# Patient Record
Sex: MALE | Race: WHITE | HISPANIC OR LATINO | Employment: UNEMPLOYED | URBAN - METROPOLITAN AREA
[De-identification: names, ages, dates, MRNs, and addresses within clinical notes are randomized per-mention and may not be internally consistent; named-entity substitution may affect disease eponyms.]

---

## 2022-02-22 ENCOUNTER — HOSPITAL ENCOUNTER (EMERGENCY)
Facility: HOSPITAL | Age: 37
Discharge: HOME | End: 2022-02-22
Attending: EMERGENCY MEDICINE | Admitting: EMERGENCY MEDICINE
Payer: COMMERCIAL

## 2022-02-22 VITALS
WEIGHT: 208.78 LBS | RESPIRATION RATE: 18 BRPM | HEART RATE: 84 BPM | SYSTOLIC BLOOD PRESSURE: 119 MMHG | OXYGEN SATURATION: 99 % | DIASTOLIC BLOOD PRESSURE: 73 MMHG

## 2022-02-22 DIAGNOSIS — F13.239 BENZODIAZEPINE WITHDRAWAL (HCC): Primary | ICD-10-CM

## 2022-02-22 LAB
ALBUMIN SERPL BCP-MCNC: 4.4 G/DL (ref 3.5–5)
ALP SERPL-CCNC: 55 U/L (ref 46–116)
ALT SERPL W P-5'-P-CCNC: 173 U/L (ref 12–78)
ANION GAP SERPL CALCULATED.3IONS-SCNC: 11 MMOL/L (ref 4–13)
APAP SERPL-MCNC: <2 UG/ML (ref 10–20)
AST SERPL W P-5'-P-CCNC: 298 U/L (ref 5–45)
ATRIAL RATE: 77 BPM
BASOPHILS # BLD AUTO: 0.08 THOUSANDS/ΜL (ref 0–0.1)
BASOPHILS NFR BLD AUTO: 1 % (ref 0–1)
BILIRUB SERPL-MCNC: 0.81 MG/DL (ref 0.2–1)
BUN SERPL-MCNC: 17 MG/DL (ref 5–25)
CALCIUM SERPL-MCNC: 9.1 MG/DL (ref 8.3–10.1)
CHLORIDE SERPL-SCNC: 103 MMOL/L (ref 100–108)
CO2 SERPL-SCNC: 26 MMOL/L (ref 21–32)
CREAT SERPL-MCNC: 1.07 MG/DL (ref 0.6–1.3)
EOSINOPHIL # BLD AUTO: 0.15 THOUSAND/ΜL (ref 0–0.61)
EOSINOPHIL NFR BLD AUTO: 2 % (ref 0–6)
ERYTHROCYTE [DISTWIDTH] IN BLOOD BY AUTOMATED COUNT: 14.9 % (ref 11.6–15.1)
ETHANOL SERPL-MCNC: <3 MG/DL (ref 0–3)
GFR SERPL CREATININE-BSD FRML MDRD: 90 ML/MIN/1.73SQ M
GLUCOSE SERPL-MCNC: 89 MG/DL (ref 65–140)
HCT VFR BLD AUTO: 45.4 % (ref 36.5–49.3)
HGB BLD-MCNC: 14.2 G/DL (ref 12–17)
IMM GRANULOCYTES # BLD AUTO: 0.02 THOUSAND/UL (ref 0–0.2)
IMM GRANULOCYTES NFR BLD AUTO: 0 % (ref 0–2)
LYMPHOCYTES # BLD AUTO: 1.98 THOUSANDS/ΜL (ref 0.6–4.47)
LYMPHOCYTES NFR BLD AUTO: 26 % (ref 14–44)
MCH RBC QN AUTO: 29 PG (ref 26.8–34.3)
MCHC RBC AUTO-ENTMCNC: 31.3 G/DL (ref 31.4–37.4)
MCV RBC AUTO: 93 FL (ref 82–98)
MONOCYTES # BLD AUTO: 1.06 THOUSAND/ΜL (ref 0.17–1.22)
MONOCYTES NFR BLD AUTO: 14 % (ref 4–12)
NEUTROPHILS # BLD AUTO: 4.3 THOUSANDS/ΜL (ref 1.85–7.62)
NEUTS SEG NFR BLD AUTO: 57 % (ref 43–75)
NRBC BLD AUTO-RTO: 0 /100 WBCS
P AXIS: 78 DEGREES
PLATELET # BLD AUTO: 282 THOUSANDS/UL (ref 149–390)
PMV BLD AUTO: 12.2 FL (ref 8.9–12.7)
POTASSIUM SERPL-SCNC: 4.6 MMOL/L (ref 3.5–5.3)
PR INTERVAL: 138 MS
PROT SERPL-MCNC: 7.9 G/DL (ref 6.4–8.2)
QRS AXIS: 47 DEGREES
QRSD INTERVAL: 82 MS
QT INTERVAL: 388 MS
QTC INTERVAL: 439 MS
RBC # BLD AUTO: 4.89 MILLION/UL (ref 3.88–5.62)
SALICYLATES SERPL-MCNC: <3 MG/DL (ref 3–20)
SODIUM SERPL-SCNC: 140 MMOL/L (ref 136–145)
T WAVE AXIS: 23 DEGREES
VENTRICULAR RATE: 77 BPM
WBC # BLD AUTO: 7.59 THOUSAND/UL (ref 4.31–10.16)

## 2022-02-22 PROCEDURE — 80179 DRUG ASSAY SALICYLATE: CPT | Performed by: EMERGENCY MEDICINE

## 2022-02-22 PROCEDURE — 96365 THER/PROPH/DIAG IV INF INIT: CPT

## 2022-02-22 PROCEDURE — 93010 ELECTROCARDIOGRAM REPORT: CPT | Performed by: INTERNAL MEDICINE

## 2022-02-22 PROCEDURE — 80143 DRUG ASSAY ACETAMINOPHEN: CPT | Performed by: EMERGENCY MEDICINE

## 2022-02-22 PROCEDURE — 96375 TX/PRO/DX INJ NEW DRUG ADDON: CPT

## 2022-02-22 PROCEDURE — 85025 COMPLETE CBC W/AUTO DIFF WBC: CPT | Performed by: EMERGENCY MEDICINE

## 2022-02-22 PROCEDURE — 82077 ASSAY SPEC XCP UR&BREATH IA: CPT | Performed by: EMERGENCY MEDICINE

## 2022-02-22 PROCEDURE — 80053 COMPREHEN METABOLIC PANEL: CPT | Performed by: EMERGENCY MEDICINE

## 2022-02-22 PROCEDURE — 36415 COLL VENOUS BLD VENIPUNCTURE: CPT | Performed by: EMERGENCY MEDICINE

## 2022-02-22 PROCEDURE — 99284 EMERGENCY DEPT VISIT MOD MDM: CPT | Performed by: EMERGENCY MEDICINE

## 2022-02-22 PROCEDURE — 99285 EMERGENCY DEPT VISIT HI MDM: CPT

## 2022-02-22 PROCEDURE — 93005 ELECTROCARDIOGRAM TRACING: CPT

## 2022-02-22 RX ORDER — LORAZEPAM 2 MG/ML
2 INJECTION INTRAMUSCULAR ONCE
Status: COMPLETED | OUTPATIENT
Start: 2022-02-22 | End: 2022-02-22

## 2022-02-22 RX ORDER — LORAZEPAM 1 MG/1
0.5 TABLET ORAL EVERY 8 HOURS PRN
Qty: 32 TABLET | Refills: 0 | Status: SHIPPED | OUTPATIENT
Start: 2022-02-22 | End: 2022-02-22 | Stop reason: CLARIF

## 2022-02-22 RX ORDER — SODIUM CHLORIDE, SODIUM GLUCONATE, SODIUM ACETATE, POTASSIUM CHLORIDE, MAGNESIUM CHLORIDE, SODIUM PHOSPHATE, DIBASIC, AND POTASSIUM PHOSPHATE .53; .5; .37; .037; .03; .012; .00082 G/100ML; G/100ML; G/100ML; G/100ML; G/100ML; G/100ML; G/100ML
1000 INJECTION, SOLUTION INTRAVENOUS ONCE
Status: COMPLETED | OUTPATIENT
Start: 2022-02-22 | End: 2022-02-22

## 2022-02-22 RX ADMIN — LORAZEPAM 2 MG: 2 INJECTION INTRAMUSCULAR; INTRAVENOUS at 12:11

## 2022-02-22 RX ADMIN — SODIUM CHLORIDE, SODIUM GLUCONATE, SODIUM ACETATE, POTASSIUM CHLORIDE, MAGNESIUM CHLORIDE, SODIUM PHOSPHATE, DIBASIC, AND POTASSIUM PHOSPHATE 1000 ML: .53; .5; .37; .037; .03; .012; .00082 INJECTION, SOLUTION INTRAVENOUS at 12:16

## 2022-02-22 NOTE — ED PROVIDER NOTES
History  Chief Complaint   Patient presents with    Shaking     Pt currently being treated at [address] [place] [address] for alcohol addiction  Known seizure history; medications recently changed; has been exp shaking, change in mental status, and severely diaphoretic since   Altered Mental Status     HPI   72-year-old male past medical history of seizure disorder, alcohol abuse presents with tremors  He states that his last drink was 2 weeks ago  He has been at [facility] recovery for his alcohol abuse for the past 4 days  He states that 3 days ago he started shaking, has been diaphoretic  He states that he was taking Ativan chronically but this was abruptly discontinued when he entered [facility] recovery  No seizures  None       No past medical history on file  No past surgical history on file  No family history on file  I have reviewed and agree with the history as documented  No existing history information found  No existing history information found  Social History     Tobacco Use    Smoking status: Not on file    Smokeless tobacco: Not on file   Substance Use Topics    Alcohol use: Not on file    Drug use: Not on file       Review of Systems   Constitutional: Positive for diaphoresis  Negative for chills and fever  HENT: Negative for dental problem and ear pain  Eyes: Negative for pain and redness  Respiratory: Negative for cough and shortness of breath  Cardiovascular: Negative for chest pain and palpitations  Gastrointestinal: Negative for abdominal pain and nausea  Endocrine: Negative for polydipsia and polyphagia  Genitourinary: Negative for dysuria and frequency  Musculoskeletal: Negative for arthralgias and joint swelling  Skin: Negative for color change and rash  Neurological: Positive for tremors  Negative for dizziness and headaches  Psychiatric/Behavioral: Negative for behavioral problems and confusion     All other systems reviewed and are negative  Physical Exam  Physical Exam  Vitals and nursing note reviewed  Constitutional:       General: He is not in acute distress  Appearance: He is well-developed  He is not diaphoretic  HENT:      Head: Atraumatic  Right Ear: External ear normal       Left Ear: External ear normal       Nose: Nose normal    Eyes:      Conjunctiva/sclera: Conjunctivae normal       Pupils: Pupils are equal, round, and reactive to light  Neck:      Vascular: No JVD  Cardiovascular:      Rate and Rhythm: Normal rate and regular rhythm  Heart sounds: Normal heart sounds  No murmur heard  Pulmonary:      Effort: Pulmonary effort is normal  No respiratory distress  Breath sounds: Normal breath sounds  No wheezing  Abdominal:      General: Bowel sounds are normal  There is no distension  Palpations: Abdomen is soft  Tenderness: There is no abdominal tenderness  Musculoskeletal:         General: Normal range of motion  Cervical back: Normal range of motion and neck supple  Skin:     General: Skin is warm and dry  Capillary Refill: Capillary refill takes less than 2 seconds  Neurological:      Mental Status: He is alert and oriented to person, place, and time  Cranial Nerves: No cranial nerve deficit        Comments: +whole body shaking   Psychiatric:         Behavior: Behavior normal          Vital Signs  ED Triage Vitals [02/22/22 1134]   Temp Pulse Respirations Blood Pressure SpO2   -- 83 21 (!) 150/101 99 %      Temp src Heart Rate Source Patient Position - Orthostatic VS BP Location FiO2 (%)   -- Monitor Sitting Right arm --      Pain Score       --           Vitals:    02/22/22 1145 02/22/22 1200 02/22/22 1300 02/22/22 1330   BP: 154/88 131/77 127/74 119/73   Pulse: 82 87 80 84   Patient Position - Orthostatic VS:  Sitting Sitting Lying         Visual Acuity      ED Medications  Medications   multi-electrolyte (PLASMALYTE-A/ISOLYTE-S PH 7 4) IV solution 1,000 mL (0 mL Intravenous Stopped 2/22/22 1332)   LORazepam (ATIVAN) injection 2 mg (2 mg Intravenous Given 2/22/22 1211)       Diagnostic Studies  Results Reviewed     Procedure Component Value Units Date/Time    Comprehensive metabolic panel [[id number]]  (Abnormal) Collected: 02/22/22 1212    Lab Status: Final result Specimen: Blood from Arm, Left Updated: 02/22/22 1242     Sodium 140 mmol/L      Potassium 4 6 mmol/L      Chloride 103 mmol/L      CO2 26 mmol/L      ANION GAP 11 mmol/L      BUN 17 mg/dL      Creatinine 1 07 mg/dL      Glucose 89 mg/dL      Calcium 9 1 mg/dL       U/L       U/L      Alkaline Phosphatase 55 U/L      Total Protein 7 9 g/dL      Albumin 4 4 g/dL      Total Bilirubin 0 81 mg/dL      eGFR 90 ml/min/1 73sq m     Narrative:      [place] guidelines for Chronic Kidney Disease (CKD):     Stage 1 with normal or high GFR (GFR > 90 mL/min/1 73 square meters)    Stage 2 Mild CKD (GFR = 60-89 mL/min/1 73 square meters)    Stage 3A Moderate CKD (GFR = 45-59 mL/min/1 73 square meters)    Stage 3B Moderate CKD (GFR = 30-44 mL/min/1 73 square meters)    Stage 4 Severe CKD (GFR = 15-29 mL/min/1 73 square meters)    Stage 5 End Stage CKD (GFR <15 mL/min/1 73 square meters)  Note: GFR calculation is accurate only with a steady state creatinine    Salicylate level [[id number]]  (Abnormal) Collected: 02/22/22 1212    Lab Status: Final result Specimen: Blood from Arm, Left Updated: 26/71/62 1278     Salicylate Lvl <3 mg/dL     Acetaminophen level-If concentration is detectable, please discuss with medical  on call   [[id number]]  (Abnormal) Collected: 02/22/22 1212    Lab Status: Final result Specimen: Blood from Arm, Left Updated: 02/22/22 1242     Acetaminophen Level <2 0 ug/mL     Ethanol [[id number]]  (Normal) Collected: 02/22/22 1212    Lab Status: Final result Specimen: Blood from Arm, Left Updated: 02/22/22 1232     Ethanol Lvl <3 mg/dL     CBC and differential [[id number]]  (Abnormal) Collected: 02/22/22 1212    Lab Status: Final result Specimen: Blood from Arm, Left Updated: 02/22/22 1218     WBC 7 59 Thousand/uL      RBC 4 89 Million/uL      Hemoglobin 14 2 g/dL      Hematocrit 45 4 %      MCV 93 fL      MCH 29 0 pg      MCHC 31 3 g/dL      RDW 14 9 %      MPV 12 2 fL      Platelets 659 Thousands/uL      nRBC 0 /100 WBCs      Neutrophils Relative 57 %      Immat GRANS % 0 %      Lymphocytes Relative 26 %      Monocytes Relative 14 %      Eosinophils Relative 2 %      Basophils Relative 1 %      Neutrophils Absolute 4 30 Thousands/µL      Immature Grans Absolute 0 02 Thousand/uL      Lymphocytes Absolute 1 98 Thousands/µL      Monocytes Absolute 1 06 Thousand/µL      Eosinophils Absolute 0 15 Thousand/µL      Basophils Absolute 0 08 Thousands/µL                  No orders to display              Procedures  Procedures         ED Course                               SBIRT 22yo+      Most Recent Value   SBIRT (24 yo +)    In order to provide better care to our patients, we are screening all of our patients for alcohol and drug use  Would it be okay to ask you these screening questions? Yes Filed at: 02/22/2022 1333   Initial Alcohol Screen: US AUDIT-C     1  How often do you have a drink containing alcohol? 0 Filed at: 02/22/2022 1333   2  How many drinks containing alcohol do you have on a typical day you are drinking? 0 Filed at: 02/22/2022 1333   3a  Male UNDER 65: How often do you have five or more drinks on one occasion? 0 Filed at: 02/22/2022 1333   3b  FEMALE Any Age, or MALE 65+: How often do you have 4 or more drinks on one occassion? 0 Filed at: 02/22/2022 1333   Audit-C Score 0 Filed at: 02/22/2022 1333   IRENE: How many times in the past year have you    Used an illegal drug or used a prescription medication for non-medical reasons?  Never Filed at: 02/22/2022 1333                    MDM  Patient presents with shaking, diaphoresis after abruptly stopping ativan when he arrived at alcohol rehab  Symptoms resolved with ativan  He wishes to go back to rehab  I discussed with rehab physician Dr Terry Peralta who will start patient on klonipin taper and he can return to rehab  Disposition  Final diagnoses:   Benzodiazepine withdrawal ([address] )     Time reflects when diagnosis was documented in both MDM as applicable and the Disposition within this note     Time User Action Codes Description Comment    2/22/2022 12:51 PM Guru Cooper Add [N99 648] Benzodiazepine withdrawal [facility])       ED Disposition     ED Disposition Condition Date/Time Comment    Discharge Stable Tue Feb 22, 2022 12:51 PM Asif Ortiz discharge to home/self care  Follow-up Information     Follow up With Specialties Details Why Contact Info Additional Information    [address] [facility] Emergency Department Emergency Medicine   [address] [facility] [id number] [facility] Emergency Department, [address] [place] [address], [facility]          There are no discharge medications for this patient  No discharge procedures on file      PDMP Review     None          ED Provider  Electronically Signed by           Erich Vuong MD  02/22/22 8668

## 2022-03-01 ENCOUNTER — APPOINTMENT (EMERGENCY)
Dept: CT IMAGING | Facility: HOSPITAL | Age: 37
DRG: 101 | End: 2022-03-01
Payer: COMMERCIAL

## 2022-03-01 ENCOUNTER — HOSPITAL ENCOUNTER (INPATIENT)
Facility: HOSPITAL | Age: 37
LOS: 3 days | Discharge: OTHER INSTITUTION | DRG: 101 | End: 2022-03-04
Attending: EMERGENCY MEDICINE | Admitting: INTERNAL MEDICINE
Payer: COMMERCIAL

## 2022-03-01 DIAGNOSIS — G40.919 BREAKTHROUGH SEIZURE (HCC): Primary | ICD-10-CM
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
## 2022-03-01 PROBLEM — R56.9 SEIZURE (HCC): Status: ACTIVE | Noted: 2022-03-01

## 2022-03-01 PROBLEM — F10.10 ALCOHOL ABUSE: Status: ACTIVE | Noted: 2022-03-01

## 2022-03-01 PROBLEM — I10 HYPERTENSION: Status: ACTIVE | Noted: 2022-03-01

## 2022-03-01 PROBLEM — R51.9 HEADACHE: Status: ACTIVE | Noted: 2022-03-01

## 2022-03-01 PROBLEM — F32.A DEPRESSION: Status: ACTIVE | Noted: 2022-03-01

## 2022-03-01 LAB
2HR DELTA HS TROPONIN: >0 NG/L
ALBUMIN SERPL BCP-MCNC: 4.1 G/DL (ref 3.5–5)
ALP SERPL-CCNC: 50 U/L (ref 46–116)
ALT SERPL W P-5'-P-CCNC: 61 U/L (ref 12–78)
ANION GAP SERPL CALCULATED.3IONS-SCNC: 9 MMOL/L (ref 4–13)
AST SERPL W P-5'-P-CCNC: 26 U/L (ref 5–45)
ATRIAL RATE: 83 BPM
BASOPHILS # BLD AUTO: 0.06 THOUSANDS/ΜL (ref 0–0.1)
BASOPHILS NFR BLD AUTO: 1 % (ref 0–1)
BILIRUB SERPL-MCNC: 0.36 MG/DL (ref 0.2–1)
BUN SERPL-MCNC: 17 MG/DL (ref 5–25)
CALCIUM SERPL-MCNC: 9.2 MG/DL (ref 8.3–10.1)
CARDIAC TROPONIN I PNL SERPL HS: 2 NG/L
CARDIAC TROPONIN I PNL SERPL HS: <2 NG/L
CHLORIDE SERPL-SCNC: 104 MMOL/L (ref 100–108)
CO2 SERPL-SCNC: 28 MMOL/L (ref 21–32)
CREAT SERPL-MCNC: 1.18 MG/DL (ref 0.6–1.3)
EOSINOPHIL # BLD AUTO: 0.11 THOUSAND/ΜL (ref 0–0.61)
EOSINOPHIL NFR BLD AUTO: 2 % (ref 0–6)
ERYTHROCYTE [DISTWIDTH] IN BLOOD BY AUTOMATED COUNT: 14.9 % (ref 11.6–15.1)
GFR SERPL CREATININE-BSD FRML MDRD: 80 ML/MIN/1.73SQ M
GLUCOSE SERPL-MCNC: 110 MG/DL (ref 65–140)
HCT VFR BLD AUTO: 42.1 % (ref 36.5–49.3)
HGB BLD-MCNC: 13.7 G/DL (ref 12–17)
IMM GRANULOCYTES # BLD AUTO: 0.01 THOUSAND/UL (ref 0–0.2)
IMM GRANULOCYTES NFR BLD AUTO: 0 % (ref 0–2)
LYMPHOCYTES # BLD AUTO: 1.75 THOUSANDS/ΜL (ref 0.6–4.47)
LYMPHOCYTES NFR BLD AUTO: 23 % (ref 14–44)
MAGNESIUM SERPL-MCNC: 2.1 MG/DL (ref 1.6–2.6)
MCH RBC QN AUTO: 29.4 PG (ref 26.8–34.3)
MCHC RBC AUTO-ENTMCNC: 32.5 G/DL (ref 31.4–37.4)
MCV RBC AUTO: 90 FL (ref 82–98)
MONOCYTES # BLD AUTO: 0.78 THOUSAND/ΜL (ref 0.17–1.22)
MONOCYTES NFR BLD AUTO: 10 % (ref 4–12)
NEUTROPHILS # BLD AUTO: 4.81 THOUSANDS/ΜL (ref 1.85–7.62)
NEUTS SEG NFR BLD AUTO: 64 % (ref 43–75)
NRBC BLD AUTO-RTO: 0 /100 WBCS
P AXIS: 71 DEGREES
PLATELET # BLD AUTO: 249 THOUSANDS/UL (ref 149–390)
PMV BLD AUTO: 11.5 FL (ref 8.9–12.7)
POTASSIUM SERPL-SCNC: 4.2 MMOL/L (ref 3.5–5.3)
PR INTERVAL: 144 MS
PROT SERPL-MCNC: 7.6 G/DL (ref 6.4–8.2)
QRS AXIS: 54 DEGREES
QRSD INTERVAL: 80 MS
QT INTERVAL: 372 MS
QTC INTERVAL: 437 MS
RBC # BLD AUTO: 4.66 MILLION/UL (ref 3.88–5.62)
SODIUM SERPL-SCNC: 141 MMOL/L (ref 136–145)
T WAVE AXIS: 44 DEGREES
TSH SERPL DL<=0.05 MIU/L-ACNC: 0.82 UIU/ML (ref 0.36–3.74)
VENTRICULAR RATE: 83 BPM
WBC # BLD AUTO: 7.52 THOUSAND/UL (ref 4.31–10.16)

## 2022-03-01 PROCEDURE — 80053 COMPREHEN METABOLIC PANEL: CPT | Performed by: EMERGENCY MEDICINE

## 2022-03-01 PROCEDURE — 36415 COLL VENOUS BLD VENIPUNCTURE: CPT | Performed by: EMERGENCY MEDICINE

## 2022-03-01 PROCEDURE — 93005 ELECTROCARDIOGRAM TRACING: CPT

## 2022-03-01 PROCEDURE — 84484 ASSAY OF TROPONIN QUANT: CPT | Performed by: EMERGENCY MEDICINE

## 2022-03-01 PROCEDURE — 83735 ASSAY OF MAGNESIUM: CPT | Performed by: EMERGENCY MEDICINE

## 2022-03-01 PROCEDURE — 84146 ASSAY OF PROLACTIN: CPT | Performed by: EMERGENCY MEDICINE

## 2022-03-01 PROCEDURE — 99223 1ST HOSP IP/OBS HIGH 75: CPT | Performed by: INTERNAL MEDICINE

## 2022-03-01 PROCEDURE — 80177 DRUG SCRN QUAN LEVETIRACETAM: CPT | Performed by: EMERGENCY MEDICINE

## 2022-03-01 PROCEDURE — 96374 THER/PROPH/DIAG INJ IV PUSH: CPT

## 2022-03-01 PROCEDURE — 93010 ELECTROCARDIOGRAM REPORT: CPT | Performed by: INTERNAL MEDICINE

## 2022-03-01 PROCEDURE — 70496 CT ANGIOGRAPHY HEAD: CPT

## 2022-03-01 PROCEDURE — 99285 EMERGENCY DEPT VISIT HI MDM: CPT

## 2022-03-01 PROCEDURE — 85025 COMPLETE CBC W/AUTO DIFF WBC: CPT | Performed by: EMERGENCY MEDICINE

## 2022-03-01 PROCEDURE — 84443 ASSAY THYROID STIM HORMONE: CPT | Performed by: EMERGENCY MEDICINE

## 2022-03-01 PROCEDURE — 70498 CT ANGIOGRAPHY NECK: CPT

## 2022-03-01 PROCEDURE — 99285 EMERGENCY DEPT VISIT HI MDM: CPT | Performed by: EMERGENCY MEDICINE

## 2022-03-01 RX ORDER — CLONIDINE HYDROCHLORIDE 0.1 MG/1
0.1 TABLET ORAL EVERY 12 HOURS SCHEDULED
Status: DISCONTINUED | OUTPATIENT
Start: 2022-03-01 | End: 2022-03-04 | Stop reason: HOSPADM

## 2022-03-01 RX ORDER — SODIUM CHLORIDE 9 MG/ML
100 INJECTION, SOLUTION INTRAVENOUS CONTINUOUS
Status: DISCONTINUED | OUTPATIENT
Start: 2022-03-01 | End: 2022-03-04 | Stop reason: HOSPADM

## 2022-03-01 RX ORDER — ACETAMINOPHEN 325 MG/1
650 TABLET ORAL EVERY 6 HOURS PRN
Status: DISCONTINUED | OUTPATIENT
Start: 2022-03-01 | End: 2022-03-04 | Stop reason: HOSPADM

## 2022-03-01 RX ORDER — PROPRANOLOL HYDROCHLORIDE 20 MG/1
60 TABLET ORAL EVERY 12 HOURS SCHEDULED
Status: DISCONTINUED | OUTPATIENT
Start: 2022-03-01 | End: 2022-03-04 | Stop reason: HOSPADM

## 2022-03-01 RX ORDER — LEVETIRACETAM 500 MG/1
1000 TABLET ORAL EVERY 12 HOURS SCHEDULED
Status: DISCONTINUED | OUTPATIENT
Start: 2022-03-01 | End: 2022-03-04 | Stop reason: HOSPADM

## 2022-03-01 RX ORDER — FLUOXETINE HYDROCHLORIDE 20 MG/1
20 CAPSULE ORAL DAILY
Status: DISCONTINUED | OUTPATIENT
Start: 2022-03-02 | End: 2022-03-04 | Stop reason: HOSPADM

## 2022-03-01 RX ORDER — BACLOFEN 10 MG/1
20 TABLET ORAL 3 TIMES DAILY PRN
Status: DISCONTINUED | OUTPATIENT
Start: 2022-03-01 | End: 2022-03-04 | Stop reason: HOSPADM

## 2022-03-01 RX ORDER — GABAPENTIN 300 MG/1
300 CAPSULE ORAL 3 TIMES DAILY
Status: DISCONTINUED | OUTPATIENT
Start: 2022-03-01 | End: 2022-03-04 | Stop reason: HOSPADM

## 2022-03-01 RX ORDER — LORAZEPAM 2 MG/ML
1 INJECTION INTRAMUSCULAR ONCE
Status: COMPLETED | OUTPATIENT
Start: 2022-03-01 | End: 2022-03-01

## 2022-03-01 RX ORDER — LANOLIN ALCOHOL/MO/W.PET/CERES
100 CREAM (GRAM) TOPICAL DAILY
Status: DISCONTINUED | OUTPATIENT
Start: 2022-03-02 | End: 2022-03-04 | Stop reason: HOSPADM

## 2022-03-01 RX ORDER — HYDROXYZINE HYDROCHLORIDE 25 MG/1
25 TABLET, FILM COATED ORAL EVERY 6 HOURS PRN
Status: DISCONTINUED | OUTPATIENT
Start: 2022-03-01 | End: 2022-03-04 | Stop reason: HOSPADM

## 2022-03-01 RX ORDER — FOLIC ACID 1 MG/1
1 TABLET ORAL DAILY
Status: DISCONTINUED | OUTPATIENT
Start: 2022-03-02 | End: 2022-03-04 | Stop reason: HOSPADM

## 2022-03-01 RX ORDER — LOSARTAN POTASSIUM 50 MG/1
50 TABLET ORAL 2 TIMES DAILY
Status: DISCONTINUED | OUTPATIENT
Start: 2022-03-01 | End: 2022-03-04 | Stop reason: HOSPADM

## 2022-03-01 RX ADMIN — LORAZEPAM 1 MG: 2 INJECTION INTRAMUSCULAR; INTRAVENOUS at 17:32

## 2022-03-01 RX ADMIN — IOHEXOL 85 ML: 350 INJECTION, SOLUTION INTRAVENOUS at 18:15

## 2022-03-01 RX ADMIN — ACETAMINOPHEN 650 MG: 325 TABLET, FILM COATED ORAL at 23:23

## 2022-03-01 RX ADMIN — LEVETIRACETAM 1000 MG: 500 TABLET, FILM COATED ORAL at 23:23

## 2022-03-01 RX ADMIN — LORAZEPAM 1 MG: 2 INJECTION INTRAMUSCULAR; INTRAVENOUS at 23:23

## 2022-03-01 NOTE — ED PROVIDER NOTES
History  Chief Complaint   Patient presents with    Seizure - Prior Hx Of     Reports no formal diagnoses, reports "multiple seizures" Reports head strike, denies blood thinners  Detox pt 3 weeks ago  Does not think these are withdrawal seizures     82-year-old male presents with multiple seizures  He was seen here approximately 1 week ago  He is at alcohol rehab facility, and has been for the past 3 weeks  Prior to rehab, he had been drinking heavily x 6 months  They have tapered him off benzodiazepines, his last emergency department visit for seizure was after they tapered him off of Ativan  His seizure today is 2 days after taper off of Klonopin  He has been on Keppra 750mg BID for the past week  Patient indicates he was not feeling well earlier today, went to rest in his bed, then woke up on the floor and had hit his head multiple times during the seizure  States over the past 2 weeks he has been "losing time" forgetting occurrances, difficulty remembering things, not feeling himself  He has never seen a neurologist                None       History reviewed  No pertinent past medical history  History reviewed  No pertinent surgical history  History reviewed  No pertinent family history  I have reviewed and agree with the history as documented  E-Cigarette/Vaping     E-Cigarette/Vaping Substances     Social History     Tobacco Use    Smoking status: Never Smoker    Smokeless tobacco: Never Used   Substance Use Topics    Alcohol use: Not on file    Drug use: Not on file       Review of Systems   Constitutional: Positive for fatigue  Negative for chills and fever  HENT: Negative for congestion and sore throat  Respiratory: Negative for cough, chest tightness and shortness of breath  Cardiovascular: Negative for chest pain and palpitations  Gastrointestinal: Negative for abdominal pain, constipation, diarrhea, nausea and vomiting  Genitourinary: Negative for dysuria and flank pain  Musculoskeletal: Negative for back pain and neck pain  Skin: Negative for color change and rash  Allergic/Immunologic: Negative for immunocompromised state  Neurological: Positive for seizures and headaches  Negative for dizziness, syncope, weakness, light-headedness and numbness  Physical Exam  Physical Exam  Vitals reviewed  Constitutional:       General: He is not in acute distress  Appearance: He is well-developed  He is not ill-appearing, toxic-appearing or diaphoretic  HENT:      Head: Normocephalic  Comments: R forehead trauma, hematoma, no open wound  No midline cervical tenderness or stepoffs     Right Ear: Tympanic membrane normal       Left Ear: Tympanic membrane normal    Eyes:      General: No scleral icterus  Right eye: No discharge  Left eye: No discharge  Conjunctiva/sclera: Conjunctivae normal       Pupils: Pupils are equal, round, and reactive to light  Neck:      Vascular: No JVD  Cardiovascular:      Rate and Rhythm: Normal rate and regular rhythm  Heart sounds: Normal heart sounds  No murmur heard  No friction rub  No gallop  Pulmonary:      Effort: Pulmonary effort is normal  No respiratory distress  Breath sounds: Normal breath sounds  No wheezing, rhonchi or rales  Chest:      Chest wall: No tenderness  Abdominal:      General: Bowel sounds are normal  There is no distension  Palpations: Abdomen is soft  Tenderness: There is no abdominal tenderness  There is no right CVA tenderness, left CVA tenderness or guarding  Musculoskeletal:         General: No tenderness or deformity  Normal range of motion  Cervical back: Normal range of motion and neck supple  No rigidity  Skin:     General: Skin is warm and dry  Coloration: Skin is not pale  Findings: No erythema or rash        Comments: Abrasion and hematoma to right anterior forehead   Neurological:      Mental Status: He is alert and oriented to person, place, and time  Cranial Nerves: No cranial nerve deficit  Sensory: No sensory deficit  Comments:  GCS 15  AAOx3  No focal neuro deficits  CN II-XII grossly intact  Speech normal, no aphasia or dysarthria  No pronator drift  Cerebellar function normal  Finger to nose normal  PERRL  EOMI  Peripheral vision intact  No nystagmus  Upper and lower extremity strength 5/5 through   strength 5/5 b/l  Gross sensation intact b/l       Psychiatric:         Behavior: Behavior normal          Vital Signs  ED Triage Vitals [03/01/22 1557]   Temperature Pulse Respirations Blood Pressure SpO2   98 3 °F (36 8 °C) 60 16 130/77 100 %      Temp Source Heart Rate Source Patient Position - Orthostatic VS BP Location FiO2 (%)   Oral Monitor Sitting Left arm --      Pain Score       --           Vitals:    03/01/22 1557 03/01/22 2018   BP: 130/77 118/78   Pulse: 60 95   Patient Position - Orthostatic VS: Sitting Sitting         Visual Acuity      ED Medications  Medications   levETIRAcetam (KEPPRA) tablet 1,000 mg (has no administration in time range)   cloNIDine (CATAPRES) tablet 0 1 mg (has no administration in time range)   hydrOXYzine HCL (ATARAX) tablet 25 mg (has no administration in time range)   gabapentin (NEURONTIN) capsule 300 mg (has no administration in time range)   losartan (COZAAR) tablet 50 mg (has no administration in time range)   baclofen tablet 20 mg (has no administration in time range)   propranolol (INDERAL) tablet 60 mg (has no administration in time range)   FLUoxetine (PROzac) capsule 20 mg (has no administration in time range)   folic acid (FOLVITE) tablet 1 mg (has no administration in time range)   thiamine tablet 100 mg (has no administration in time range)   LORazepam (ATIVAN) injection 1 mg (1 mg Intravenous Given 3/1/22 1732)   iohexol (OMNIPAQUE) 350 MG/ML injection (SINGLE-DOSE) 85 mL (85 mL Intravenous Given 3/1/22 1815)       Diagnostic Studies  Results Reviewed     Procedure Component Value Units Date/Time    HS Troponin I 2hr [[id number]]  (Normal) Collected: 03/01/22 2017    Lab Status: Final result Specimen: Blood from Arm, Left Updated: 03/01/22 2046     hs TnI 2hr 2 ng/L      Delta 2hr hsTnI >0 ng/L     HS Troponin I 4hr [[id number]]     Lab Status: No result Specimen: Blood     Magnesium [[id number]]  (Normal) Collected: 03/01/22 1719    Lab Status: Final result Specimen: Blood from Arm, Right Updated: 03/01/22 1758     Magnesium 2 1 mg/dL     TSH, 3rd generation with Free T4 reflex [[id number]]  (Normal) Collected: 03/01/22 1719    Lab Status: Final result Specimen: Blood from Arm, Right Updated: 03/01/22 1758     TSH 3RD GENERATON 0 819 uIU/mL     Narrative:      Patients undergoing fluorescein dye angiography may retain small amounts of fluorescein in the body for 48-72 hours post procedure  Samples containing fluorescein can produce falsely depressed TSH values  If the patient had this procedure,a specimen should be resubmitted post fluorescein clearance        HS Troponin 0hr (reflex protocol) [[id number]]  (Normal) Collected: 03/01/22 1719    Lab Status: Final result Specimen: Blood from Arm, Right Updated: 03/01/22 1758     hs TnI 0hr <2 ng/L     Comprehensive metabolic panel [[id number]] Collected: 03/01/22 1719    Lab Status: Final result Specimen: Blood from Arm, Right Updated: 03/01/22 1748     Sodium 141 mmol/L      Potassium 4 2 mmol/L      Chloride 104 mmol/L      CO2 28 mmol/L      ANION GAP 9 mmol/L      BUN 17 mg/dL      Creatinine 1 18 mg/dL      Glucose 110 mg/dL      Calcium 9 2 mg/dL      AST 26 U/L      ALT 61 U/L      Alkaline Phosphatase 50 U/L      Total Protein 7 6 g/dL      Albumin 4 1 g/dL      Total Bilirubin 0 36 mg/dL      eGFR 80 ml/min/1 73sq m     Narrative:      [place] guidelines for Chronic Kidney Disease (CKD):     Stage 1 with normal or high GFR (GFR > 90 mL/min/1 73 square meters)    Stage 2 Mild CKD (GFR = 60-89 mL/min/1 73 square meters)    Stage 3A Moderate CKD (GFR = 45-59 mL/min/1 73 square meters)    Stage 3B Moderate CKD (GFR = 30-44 mL/min/1 73 square meters)    Stage 4 Severe CKD (GFR = 15-29 mL/min/1 73 square meters)    Stage 5 End Stage CKD (GFR <15 mL/min/1 73 square meters)  Note: GFR calculation is accurate only with a steady state creatinine    CBC and differential [[id number]] Collected: 03/01/22 1719    Lab Status: Final result Specimen: Blood from Arm, Right Updated: 03/01/22 1733     WBC 7 52 Thousand/uL      RBC 4 66 Million/uL      Hemoglobin 13 7 g/dL      Hematocrit 42 1 %      MCV 90 fL      MCH 29 4 pg      MCHC 32 5 g/dL      RDW 14 9 %      MPV 11 5 fL      Platelets 896 Thousands/uL      nRBC 0 /100 WBCs      Neutrophils Relative 64 %      Immat GRANS % 0 %      Lymphocytes Relative 23 %      Monocytes Relative 10 %      Eosinophils Relative 2 %      Basophils Relative 1 %      Neutrophils Absolute 4 81 Thousands/µL      Immature Grans Absolute 0 01 Thousand/uL      Lymphocytes Absolute 1 75 Thousands/µL      Monocytes Absolute 0 78 Thousand/µL      Eosinophils Absolute 0 11 Thousand/µL      Basophils Absolute 0 06 Thousands/µL     Levetiracetam level [[id number]] Collected: 03/01/22 1719    Lab Status: In process Specimen: Blood from Arm, Right Updated: 03/01/22 1729    Prolactin [[id number]] Collected: 03/01/22 1723    Lab Status: In process Specimen: Blood from Arm, Right Updated: 03/01/22 1729                 CTA head and neck with and without contrast   ED Interpretation by Chas Patricio DO (03/01 2027)   1  No evidence of acute infarct, intracranial hemorrhage or mass  2   No stenosis, dissection or occlusion of the carotid or vertebral arteries or major vessels of the [place]  Final Result by Mckay Gr MD (03/01 1923)         1  No evidence of acute infarct, intracranial hemorrhage or mass     2   No stenosis, dissection or occlusion of the carotid or vertebral arteries or major vessels of the [place]  Workstation performed: [id number]                    Procedures  Procedures         ED Course  ED Course as of 03/01/22 2231   Tue Mar 01, 2022   1802 Magnesium: 2 1   2100 TT sent to [facility] for admission  SBIRT 22yo+      Most Recent Value   SBIRT (24 yo +)    In order to provide better care to our patients, we are screening all of our patients for alcohol and drug use  Would it be okay to ask you these screening questions? Yes  [Currently in rehab for [address] at: 03/01/2022 1659   Initial Alcohol Screen: US AUDIT-C     1  How often do you have a drink containing alcohol? 0 Filed at: 03/01/2022 1659   2  How many drinks containing alcohol do you have on a typical day you are drinking? 0 Filed at: 03/01/2022 1659   3a  Male UNDER 65: How often do you have five or more drinks on one occasion? 0 Filed at: 03/01/2022 1659   3b  FEMALE Any Age, or MALE 65+: How often do you have 4 or more drinks on one occassion? 0 Filed at: 03/01/2022 1659   Audit-C Score 0 Filed at: 03/01/2022 1659   IRENE: How many times in the past year have you    Used an illegal drug or used a prescription medication for non-medical reasons? Never Filed at: 03/01/2022 1659                    MDM  Number of Diagnoses or Management Options  Breakthrough seizure ([facility] [address] )  Diagnosis management comments: Seizures  Unclear if secondary to prior concussions, alcohol withdrawal, or benzodiazepine withdrawal   Will image head - Provide Ativan here to prevent further seizure  Discussed with he will likely require admission for further neuro work up and tx  Patient agreeable to admission for further diagnosis          Amount and/or Complexity of Data Reviewed  Clinical lab tests: ordered and reviewed  Tests in the radiology section of CPT®: ordered and reviewed        Disposition  Final diagnoses:   Breakthrough seizure ([facility] [address] )     Time reflects when diagnosis was documented in both MDM as applicable and the Disposition within this note     Time User Action Codes Description Comment    3/1/2022  9:07 PM Deanna Lindquist Add [G40 389] Breakthrough seizure [facility])       ED Disposition     ED Disposition Condition Date/Time Comment    Admit Stable Tue Mar 1, 2022  9:08 PM Case was discussed with Isabel (BALJINDER NOEL) and the patient's admission status was agreed to be Admission Status: inpatient status to the service of Dr Gracia Bailey [facility])   Follow-up Information    None         Patient's Medications    No medications on file       No discharge procedures on file      PDMP Review     None          ED Provider  Electronically Signed by           Chas Patricio DO  03/01/22 1924

## 2022-03-02 ENCOUNTER — APPOINTMENT (INPATIENT)
Dept: MRI IMAGING | Facility: HOSPITAL | Age: 37
DRG: 101 | End: 2022-03-02
Payer: COMMERCIAL

## 2022-03-02 ENCOUNTER — APPOINTMENT (INPATIENT)
Dept: RADIOLOGY | Facility: HOSPITAL | Age: 37
DRG: 101 | End: 2022-03-02
Payer: COMMERCIAL

## 2022-03-02 LAB
PROLACTIN SERPL-MCNC: 7.7 NG/ML (ref 2.5–17.4)
T4 FREE SERPL-MCNC: 0.95 NG/DL (ref 0.76–1.46)

## 2022-03-02 PROCEDURE — G1004 CDSM NDSC: HCPCS

## 2022-03-02 PROCEDURE — 99233 SBSQ HOSP IP/OBS HIGH 50: CPT | Performed by: INTERNAL MEDICINE

## 2022-03-02 PROCEDURE — 36415 COLL VENOUS BLD VENIPUNCTURE: CPT | Performed by: INTERNAL MEDICINE

## 2022-03-02 PROCEDURE — 99222 1ST HOSP IP/OBS MODERATE 55: CPT | Performed by: PSYCHIATRY & NEUROLOGY

## 2022-03-02 PROCEDURE — A9585 GADOBUTROL INJECTION: HCPCS | Performed by: INTERNAL MEDICINE

## 2022-03-02 PROCEDURE — 70553 MRI BRAIN STEM W/O & W/DYE: CPT

## 2022-03-02 PROCEDURE — 84439 ASSAY OF FREE THYROXINE: CPT | Performed by: INTERNAL MEDICINE

## 2022-03-02 RX ORDER — LORAZEPAM 2 MG/ML
1 INJECTION INTRAMUSCULAR EVERY 4 HOURS PRN
Status: DISCONTINUED | OUTPATIENT
Start: 2022-03-02 | End: 2022-03-04 | Stop reason: HOSPADM

## 2022-03-02 RX ORDER — LORAZEPAM 2 MG/ML
0.5 INJECTION INTRAMUSCULAR ONCE AS NEEDED
Status: COMPLETED | OUTPATIENT
Start: 2022-03-02 | End: 2022-03-02

## 2022-03-02 RX ADMIN — ACETAMINOPHEN 650 MG: 325 TABLET, FILM COATED ORAL at 20:42

## 2022-03-02 RX ADMIN — ACETAMINOPHEN 650 MG: 325 TABLET, FILM COATED ORAL at 08:24

## 2022-03-02 RX ADMIN — FOLIC ACID 1 MG: 1 TABLET ORAL at 08:26

## 2022-03-02 RX ADMIN — CLONIDINE HYDROCHLORIDE 0.1 MG: 0.1 TABLET ORAL at 20:42

## 2022-03-02 RX ADMIN — LEVETIRACETAM 1000 MG: 500 TABLET, FILM COATED ORAL at 08:24

## 2022-03-02 RX ADMIN — PROPRANOLOL HYDROCHLORIDE 60 MG: 20 TABLET ORAL at 20:42

## 2022-03-02 RX ADMIN — GADOBUTROL 9 ML: 604.72 INJECTION INTRAVENOUS at 13:43

## 2022-03-02 RX ADMIN — GABAPENTIN 300 MG: 300 CAPSULE ORAL at 16:28

## 2022-03-02 RX ADMIN — ENOXAPARIN SODIUM 40 MG: 40 INJECTION SUBCUTANEOUS at 08:24

## 2022-03-02 RX ADMIN — LORAZEPAM 0.5 MG: 2 INJECTION INTRAMUSCULAR; INTRAVENOUS at 12:39

## 2022-03-02 RX ADMIN — SODIUM CHLORIDE 100 ML/HR: 9 INJECTION, SOLUTION INTRAVENOUS at 00:01

## 2022-03-02 RX ADMIN — GABAPENTIN 300 MG: 300 CAPSULE ORAL at 20:42

## 2022-03-02 RX ADMIN — THIAMINE HCL TAB 100 MG 100 MG: 100 TAB at 08:26

## 2022-03-02 RX ADMIN — LOSARTAN POTASSIUM 50 MG: 50 TABLET, FILM COATED ORAL at 20:42

## 2022-03-02 RX ADMIN — FLUOXETINE 20 MG: 20 CAPSULE ORAL at 08:26

## 2022-03-02 RX ADMIN — LEVETIRACETAM 1000 MG: 500 TABLET, FILM COATED ORAL at 20:42

## 2022-03-02 RX ADMIN — SODIUM CHLORIDE 100 ML/HR: 9 INJECTION, SOLUTION INTRAVENOUS at 17:06

## 2022-03-02 NOTE — H&P
Cathy Calloway [address] 1985, 31 y o  male MRN: [id number]  Unit/Bed#: ED 15 Encounter: [id number]  Primary Care Provider: No primary care provider on file  Date and time admitted to hospital: 3/1/2022  4:34 PM    * Seizure ([address] )  Assessment & Plan      /78 (BP Location: Left arm)   Pulse 95   Temp 98 3 °F (36 8 °C) (Oral)   Resp 16   SpO2 98%     History of alcohol abuse recently completed detox at [facility] transition to [facility] presents with seizure on 2/22/2022 seen in the ED here, discharged back to [facility] presents with breakthrough seizure today  -Ativan p r n  for seizure  -seizure precaution  -increase Keppra to 1000 mg b i d   -patient with no recent neurologic evaluation for multiple seizure, will need EEG and neuro consult  -consider MRIB if eeg negative      Alcohol abuse  Assessment & Plan  · Recent completed detox at [address] [place] [address] transition to [facility]  · Last alcohol intake 3 weeks ago, on Klonopin taper, will hold while giving Ativan  · Continue thiamine, folic acid    Fall  Assessment & Plan  · Not currently on any blood thinners  · Fall secondary to seizure  · Fall precaution  · CTA head/neck without acute abnormality     Headache  Assessment & Plan  -CTA head and neck unremarkable  -Acetaminophen p r n  Hypertension  Assessment & Plan  Continue clonidine, propranolol and losartan    Depression  Assessment & Plan  Continue Prozac daily    VTE Pharmacologic Prophylaxis: VTE Score: 4 Moderate Risk (Score 3-4) - Pharmacological DVT Prophylaxis Ordered: enoxaparin (Lovenox)  Code Status: Full  Discussion with family: none  Anticipated Length of Stay: Patient will be admitted on an inpatient basis with an anticipated length of stay of greater than 2 midnights secondary to Breakthrough seizure      Total Time for Visit, including Counseling / Coordination of Care: 60 minutes Greater than 50% of this total time spent on direct patient counseling and coordination of care  Chief Complaint:   Seizure    History of Present Illness:  Quentin Mcclendon is a 31 y o  male with a PMH of alcohol abuse, brain concussion, seizure disorder started at age 17 on Depakote for few months stopped on his own who presents with seizure  Patient reports history of heavy alcohol use for past 6 months, was admitted to [place], [place] for alcohol detox for 8 days transferred to [facility] recovery center last week  Patient was seen in ED on 2/22/2022 for seizure after he was tapered off Ativan  He also reports seizure associated with fall in 01/2022/  Today, he did not feel well  While in bed, he fell to the floor, hit his head multiple times and had witnessed seizure  Patient bit his tongue, but denies bowel or bladder incontinence  Patient currently on Keppra 750mg bid  He complains of feeling more forgetful and occipital headache  Review of Systems:  Review of Systems  Comprehensive review of systems is negative except above HPI  Past Medical and Surgical History:   Past Medical History:   Diagnosis Date    Alcohol abuse     Brain concussion     Hypertension     Seizure disorder ([facility] [address] )        Past Surgical History:   Procedure Laterality Date    LUMBAR LAMINECTOMY N/A        Meds/Allergies:  Prior to Admission medications    Medication Sig Start Date End Date Taking? Authorizing Provider   LORazepam (Ativan) 1 mg tablet Take 0 5 tablets (0 5 mg total) by mouth every 8 (eight) hours as needed for anxiety for up to 21 days 2/22/22 2/22/22  Mulu Morales MD     I have reviewed home medications with patient personally      Allergies: No Known Allergies    Social History:  Marital Status: Single   Occupation:   Patient Pre-hospital Living Situation: Apartment  Patient Pre-hospital Level of Mobility: walks  Patient Pre-hospital Diet Restrictions: none   Substance Use History: Social History     Substance and Sexual Activity   Alcohol Use Yes    Comment: Last drink 3 weeks ago     Social History     Tobacco Use   Smoking Status Never Smoker   Smokeless Tobacco Never Used     Social History     Substance and Sexual Activity   Drug Use Yes    Types: Marijuana       Family History:  Family History   Problem Relation Age of Onset    Ovarian cancer Mother        Physical Exam:     Vitals:   Blood Pressure: 118/78 (03/01/22 2018)  Pulse: 95 (03/01/22 2018)  Temperature: 98 3 °F (36 8 °C) (03/01/22 1557)  Temp Source: Oral (03/01/22 1557)  Respirations: 16 (03/01/22 2018)  SpO2: 98 % (03/01/22 2018)    Physical Exam  Vitals and nursing note reviewed  Constitutional:       Appearance: Normal appearance  He is well-developed  HENT:      Head: Normocephalic and atraumatic  Eyes:      Extraocular Movements: Extraocular movements intact  Conjunctiva/sclera: Conjunctivae normal       Pupils: Pupils are equal, round, and reactive to light  Cardiovascular:      Rate and Rhythm: Normal rate and regular rhythm  Pulses: Normal pulses  Heart sounds: Normal heart sounds  No murmur heard  Pulmonary:      Effort: Pulmonary effort is normal  No respiratory distress  Breath sounds: Normal breath sounds  Abdominal:      General: Abdomen is flat  Bowel sounds are normal  There is no distension  Palpations: Abdomen is soft  Tenderness: There is no abdominal tenderness  Musculoskeletal:      Cervical back: Neck supple  Right lower leg: No edema  Left lower leg: No edema  Skin:     General: Skin is warm and dry  Comments: Right forehead abrasion and hematoma   Neurological:      General: No focal deficit present  Mental Status: He is alert and oriented to person, place, and time  Cranial Nerves: No cranial nerve deficit  Sensory: No sensory deficit  Motor: No weakness        Coordination: Coordination normal       Deep Tendon Reflexes: Reflexes normal    Psychiatric:         Mood and Affect: Mood normal          Behavior: Behavior normal           Additional Data:     Lab Results:  Results from last 7 days   Lab Units 03/01/22  1719   WBC Thousand/uL 7 52   HEMOGLOBIN g/dL 13 7   HEMATOCRIT % 42 1   PLATELETS Thousands/uL 249   NEUTROS PCT % 64   LYMPHS PCT % 23   MONOS PCT % 10   EOS PCT % 2     Results from last 7 days   Lab Units 03/01/22  1719   SODIUM mmol/L 141   POTASSIUM mmol/L 4 2   CHLORIDE mmol/L 104   CO2 mmol/L 28   BUN mg/dL 17   CREATININE mg/dL 1 18   ANION GAP mmol/L 9   CALCIUM mg/dL 9 2   ALBUMIN g/dL 4 1   TOTAL BILIRUBIN mg/dL 0 36   ALK PHOS U/L 50   ALT U/L 61   AST U/L 26   GLUCOSE RANDOM mg/dL 110                       Imaging: Reviewed radiology reports from this admission including: CT head  CTA head and neck with and without contrast   ED Interpretation by Ray Mathias DO (03/01 2027)   1  No evidence of acute infarct, intracranial hemorrhage or mass  2   No stenosis, dissection or occlusion of the carotid or vertebral arteries or major vessels of the [place]  Final Result by Harry Nazario MD (03/01 1923)         1  No evidence of acute infarct, intracranial hemorrhage or mass  2   No stenosis, dissection or occlusion of the carotid or vertebral arteries or major vessels of the [place]  Workstation performed: [id number]             EKG and Other Studies Reviewed on Admission:   · EKG: NSR  HR 83 bpm    · STARLA    ** Please Note: This note has been constructed using a voice recognition system   **

## 2022-03-02 NOTE — ASSESSMENT & PLAN NOTE
31 y o  male with epilepsy (diagnosed at age 17 and was on Depakote for a few months but stopped on his own), alcohol abuse, multiple prior TBIs, and HTN who presented to [facility] on 3/1/2022 due to seizure with tongue bite  Recently completed detox at [facility] in [place] and now transitioned to [facility]] with last alcoholic beverage 3 weeks ago  Denies any drug use  He recently completed a Klonopin taper  While in rehab he was started on Keppra 750 mg BID  MRI brain w/wo contrast unremarkable  Etiology for breakthrough seizure likely due to known epilepsy and requiring higher dose of Keppra  Plan:  - Routine EEG pending  - Keppra increased to 1000 mg BID (previously on Keppra 750 mg BID)  - Seizure precautions  - Ativan PRN prolonged breakthrough seizure  - Continue thiamine  - PT/OT  - Frequent neuro checks  Continue to monitor and notify neurology with any changes     - Medical management and supportive care per primary team  Correction of any metabolic or infectious disturbances

## 2022-03-02 NOTE — ASSESSMENT & PLAN NOTE
· Recent completed detox at [address] [place] [address] to [facility] recovery center  · Last alcohol intake 3 weeks ago, on Klonopin taper, will hold while giving Ativan  · Continue thiamine, folic acid

## 2022-03-02 NOTE — CASE MANAGEMENT
Case Management Assessment & Discharge Planning Note    Patient name Shari Chavez  Location ED 15/ED 15 MRN [id number]  : 1985 Date 3/2/2022       Current Admission Date: 3/1/2022  Current Admission Diagnosis:Seizure [facility])   Patient Active Problem List    Diagnosis Date Noted    Seizure ([address] ) 2022    Alcohol abuse 2022    Fall 2022    Depression 2022    Hypertension 2022    Headache 2022      LOS (days): 1  Geometric Mean LOS (GMLOS) (days):   Days to GMLOS:     OBJECTIVE:    Risk of Unplanned Readmission Score: 8         Current admission status: Inpatient       Preferred Pharmacy:   PATIENT/FAMILY REPORTS NO PREFERRED PHARMACY  No address on file      Primary Care Provider: No primary care provider on file  Primary Insurance: BLUE CROSS  Secondary Insurance:     ASSESSMENT:  [address] [place] [address],[address], [facility] Representative - Mother   Primary Phone: [phone] (Mobile)               Advance Directives  Does patient have a [address] [place] [address] Avenue?: No  Was patient offered paperwork?: Yes (Mother refused paperwork )  Does patient currently have a Health Care decision maker?: Yes, please see Health Care Proxy section  Does patient have Advance Directives?: No  Was patient offered paperwork?: Yes (Mother refused paperwork )  Primary Contact: Patient's Mother    Readmission Root Cause  30 Day Readmission: No    Patient Information  Admitted from[de-identified] Facility [facility])  Mental Status: Alert  During Assessment patient was accompanied by: Not accompanied during assessment  Assessment information provided by[de-identified] Parent  Primary Caregiver: Self  Support Systems: Family members  [place] of Residence: Other (specify in comment box) ([address])  What city do you live in[place] [address] entry access options   Select all that apply : No steps to enter home  Type of Current Residence: Apartment  Floor Level: 1  Upon entering residence, is there a bedroom on the main floor (no further steps)?: Yes  Upon entering residence, is there a bathroom on the main floor (no further steps)?: Yes  In the last 12 months, was there a time when you were not able to pay the mortgage or rent on time?: No  In the last 12 months, how many places have you lived?: 1  In the last 12 months, was there a time when you did not have a steady place to sleep or slept in a shelter (including now)?: No  Homeless/housing insecurity resource given?: N/A  Living Arrangements: Lives Alone  Is patient a ?: No    Activities of Daily Living Prior to Admission  Functional Status: Independent  Completes ADLs independently?: Yes  Ambulates independently?: Yes  Does patient use assisted devices?: No  Does patient currently own DME?: No  Does patient have a history of Outpatient Therapy (PT/OT)?: Yes (Patient has OP PT Hx following a back surgery years ago )  Does the patient have a history of Short-Term Rehab?: No  Does patient have a history of C?: No  Does patient currently have [facility]?: No    Patient Information Continued  Income Source: Unemployed  Does patient have prescription coverage?: Yes (Patient uses [address] in [place], [place] )  Within the past 12 months, you worried that your food would run out before you got the money to buy more : Never true  Within the past 12 months, the food you bought just didnt last and you didnt have money to get more : Never true  Food insecurity resource given?: N/A  Does patient receive dialysis treatments?: No  Does patient have a history of substance abuse?: Yes  Historical substance use preference: Alcohol/ETOH  Is patient currently in treatment for substance abuse?: Yes (Patient has gone to [email or web address] in [place] a few years ago  He is currently at [facility] completing IP tx   He has about 2 weeks left of his 30 day program )  Does patient have a history of Mental Health Diagnosis?: Yes (anxiety, depression)  Is patient receiving treatment for mental health?: No  Patient declined treatment information  Has patient received inpatient treatment related to mental health in the last 2 years?: No    Means of Transportation  Means of Transport to Appts[de-identified] License Suspended/Revoked  In the past 12 months, has lack of transportation kept you from medical appointments or from getting medications?: No  In the past 12 months, has lack of transportation kept you from meetings, work, or from getting things needed for daily living?: No  Was application for public transport provided?: N/A    DISCHARGE DETAILS:    Discharge planning discussed with[de-identified] Patient's Mother  Freedom of Choice: Yes  Comments - Freedom of Choice: Patient's mother reported that patient will likely return to [facility] to complete treatment at discharge  Mother reported that she can provide transportation if needed  CM to confirm with patient  CM contacted family/caregiver?: Yes  Were Treatment Team discharge recommendations reviewed with patient/caregiver?: Yes  Did patient/caregiver verbalize understanding of patient care needs?: Yes  Were patient/caregiver advised of the risks associated with not following Treatment Team discharge recommendations?: Yes    Contacts  Patient Contacts: Goyo Dangelo  Relationship to Patient[de-identified] Family  Contact Method: Phone  Phone Number: [phone]  Reason/Outcome: Continuity of [address]         Is the patient interested in [facility] at discharge?: No    DME Referral Provided  Referral made for DME?: No    Would you like to participate in our [address] service program?  : No - Declined    Treatment Team Recommendation: Home  Discharge Destination Plan[de-identified] Substance Abuse Treatment [facility])  Transport at Discharge : Other (Comment) (Facility will likely provide transportation   If not, patient's mother willing to transport at [place] )

## 2022-03-02 NOTE — ASSESSMENT & PLAN NOTE
/78 (BP Location: Left arm)   Pulse 95   Temp 98 3 °F (36 8 °C) (Oral)   Resp 16   SpO2 98%     History of alcohol abuse recently completed detox at [facility] transition to [facility] recovery center presents with seizure on 2/22/2022 seen in the ED here, discharged back to [facility] presents with breakthrough seizure today  -Ativan p r n  for seizure  -seizure precaution  -increase Keppra to 1000 mg b i d   -patient with no recent neurologic evaluation for multiple seizure, will need EEG and neuro consult  -consider MRIB if eeg negative

## 2022-03-02 NOTE — ASSESSMENT & PLAN NOTE
Continue clonidine, propranolol and losartan    Blood pressure 114/80, pulse 87, temperature 98 3 °F (36 8 °C), temperature source Oral, resp  rate 16, SpO2 95 %

## 2022-03-02 NOTE — ASSESSMENT & PLAN NOTE
/80   Pulse 87   Temp 98 3 °F (36 8 °C) (Oral)   Resp 16   SpO2 95%     History of alcohol abuse recently completed detox at [facility] transition to [facility] recovery center presents with seizure on 2/22/2022 seen in the ED here, discharged back to [facility] recovery center presents with breakthrough seizure today  -Ativan p r n  for seizure  -seizure precaution to continue  -increase Keppra to 1000 mg b i d   -patient with no recent neurologic evaluation for multiple seizure, will need EEG and neuro consult  -MRI done  Report pending    Neurology input appreciated

## 2022-03-02 NOTE — ASSESSMENT & PLAN NOTE
· Recent completed detox at [address] [place] [address] to [facility] recovery center  · Last alcohol intake 3 weeks ago, on Klonopin taper, will hold while giving Ativan  · Continue thiamine, folic acid

## 2022-03-02 NOTE — ASSESSMENT & PLAN NOTE
- History of alcohol abuse and recently completed detox at [facility] in [place] now transitioned to [facility]  - Last alcoholic beverage was 3 weeks ago    - Continue thiamine and folic acid

## 2022-03-02 NOTE — ASSESSMENT & PLAN NOTE
· Not currently on any blood thinners  · Fall secondary to seizure  · Fall precaution  · CTA head/neck without acute abnormality

## 2022-03-02 NOTE — PROGRESS NOTES
[facility]  Progress Note - Wayne Bae 1985, 31 y o  male MRN: [id number]  Unit/Bed#: ED 15 Encounter: [id number]  Primary Care Provider: No primary care provider on file  Date and time admitted to hospital: 3/1/2022  4:34 PM    * Seizure ([address] )  Assessment & Plan      /80   Pulse 87   Temp 98 3 °F (36 8 °C) (Oral)   Resp 16   SpO2 95%     History of alcohol abuse recently completed detox at [facility] transition to [facility] presents with seizure on 2/22/2022 seen in the ED here, discharged back to [facility] presents with breakthrough seizure today  -Ativan p r n  for seizure  -seizure precaution to continue  -increase Keppra to 1000 mg b i d   -patient with no recent neurologic evaluation for multiple seizure, will need EEG and neuro consult  -MRI done  Report pending  Neurology input appreciated      Headache  Assessment & Plan  -CTA head and neck unremarkable  -Acetaminophen p r n  to continue    Hypertension  Assessment & Plan  Continue clonidine, propranolol and losartan    Blood pressure 114/80, pulse 87, temperature 98 3 °F (36 8 °C), temperature source Oral, resp  rate 16, SpO2 95 %  Depression  Assessment & Plan  Continue Prozac daily  Fall  Assessment & Plan  · Not currently on any blood thinners  · Fall secondary to seizure  · Fall precaution, may ambulate with supervision  · CTA head/neck without acute abnormality     Alcohol abuse  Assessment & Plan  · Recent completed detox at [address] [place] [address] transition to [facility]  · Last alcohol intake 3 weeks ago, on Klonopin taper, will hold while giving Ativan  · Continue thiamine, folic acid  TE Pharmacologic Prophylaxis: Enoxaparin (Lovenox)    Patient Centered Rounds: I have performed bedside rounds with nursing staff today    Discussions with Specialists or Other Care Team Provider:  Care team  Education and Discussions with Family / Patient:  Patient    Current Length of Stay: 1 day(s)  Current Patient Status: Inpatient     Certification Statement: The patient will continue to require additional inpatient hospital stay due to Seizure [facility])  Discharge Plan / Estimated Discharge Date:  1-2 days    Code Status: Level 1 - Full Code  ______________________________________________________________________________    Subjective:   Patient seen and examined by me  No chest pain, palpitations or diaphoresis at this point of time  No fever or chills  Does have weakness of the weakness is generalized  Has not had any seizures so far today  Patient still states that he feels a little off but overall states that he is a little better compared to yesterday  He states he is not yet back to 100%    Objective:   Vitals: Blood pressure 114/80, pulse 87, temperature 98 3 °F (36 8 °C), temperature source Oral, resp  rate 16, SpO2 95 %      Physical Exam:   General appearance: alert, appears stated age and cooperative  Constitutional- looks a little weak  HEENT - atraumatic and normocephalic  Neck- supple  Skin - no fresh rash  Extremities no fresh focal deformities  Cardiovascular- S1-S2 heard  Respiratory- bilateral air entry present, no crackles or rhonchi  Skin - no fresh rash  Abdomen - normal bowel sounds present, no rebound tenderness  CNS- No fresh focal deficits  Psych- no acute psychosis     Additional Data:   Labs:  Results from last 7 days   Lab Units 03/01/22  1719   WBC Thousand/uL 7 52   HEMOGLOBIN g/dL 13 7   HEMATOCRIT % 42 1   MCV fL 90   PLATELETS Thousands/uL 249     Results from last 7 days   Lab Units 03/01/22  1719   SODIUM mmol/L 141   POTASSIUM mmol/L 4 2   CHLORIDE mmol/L 104   CO2 mmol/L 28   ANION GAP mmol/L 9   BUN mg/dL 17   CREATININE mg/dL 1 18   CALCIUM mg/dL 9 2   ALBUMIN g/dL 4 1   TOTAL BILIRUBIN mg/dL 0 36   ALK PHOS U/L 50   ALT U/L 61   AST U/L 26   EGFR ml/min/1 73sq m 80   GLUCOSE RANDOM mg/dL 110     Results from last 7 days   Lab Units 03/01/22  1719   MAGNESIUM mg/dL 2 1                          Results from last 7 days   Lab Units 03/02/22  0558 03/01/22  1719   TSH 3RD GENERATON uIU/mL  --  0 819   FREE T4 ng/dL 0 95  --      * I Have Reviewed All Lab Data Listed Above  Cultures:               Imaging:  Imaging Reports Reviewed Today Include:   CTA head and neck with and without contrast    Result Date: 3/1/2022  Impression: 1  No evidence of acute infarct, intracranial hemorrhage or mass  2   No stenosis, dissection or occlusion of the carotid or vertebral arteries or major vessels of the [place]  Workstation performed: [id number]     XR follow up    Result Date: 3/2/2022  Impression: No radiopaque orbital foreign body  Workstation performed: [id number]     Scheduled Meds:  Current Facility-Administered Medications   Medication Dose Route Frequency Provider Last Rate    acetaminophen  650 mg Oral Q6H PRN [place] MD Brandy      baclofen  20 mg Oral TID PRN bJ Nava MD      cloNIDine  0 1 mg Oral Q12H Elaina Solomon MD      enoxaparin  40 mg Subcutaneous Daily [place] GrMD niki      FLUoxetine  20 mg Oral Daily Jb GrMD niki      folic acid  1 mg Oral Daily Rockford MD Brandy      gabapentin  300 mg Oral TID Rockford MD Brandy      hydrOXYzine HCL  25 mg Oral Q6H PRN Rockford MD Brandy      levETIRAcetam  1,000 mg Oral Q12H Elaina Solomon MD      LORazepam  1 mg Intravenous Q4H PRN Jb Nava MD      losartan  50 mg Oral BID Jb Nava MD      propranolol  60 mg Oral Q12H Elaina Solomon MD      sodium chloride  100 mL/hr Intravenous Continuous Jb Nava  mL/hr (03/02/22 0001)    thiamine  100 mg Oral Daily Jb MD Denise Nava MD [address] Internal Medicine    ** Please Note: This note has been constructed using a voice recognition system   **

## 2022-03-02 NOTE — PLAN OF CARE
Problem: Potential for Falls  Goal: Patient will remain free of falls  Description: INTERVENTIONS:  - Educate patient/family on patient safety including physical limitations  - Instruct patient to call for assistance with activity   - Consult OT/PT to assist with strengthening/mobility   - Keep Call bell within reach  - Keep bed low and locked with side rails adjusted as appropriate  - Keep care items and personal belongings within reach  - Initiate and maintain comfort rounds  - Make Fall Risk Sign visible to staff  - Offer Toileting every 2 Hours, in advance of need  - Initiate/Maintain bed alarm  - Obtain necessary fall risk management equipment:   - Apply yellow socks and bracelet for high fall risk patients  - Consider moving patient to room near nurses station  Outcome: Progressing     Problem: PAIN - ADULT  Goal: Verbalizes/displays adequate comfort level or baseline comfort level  Description: Interventions:  - Encourage patient to monitor pain and request assistance  - Assess pain using appropriate pain scale  - Administer analgesics based on type and severity of pain and evaluate response  - Implement non-pharmacological measures as appropriate and evaluate response  - Consider cultural and social influences on pain and pain management  - Notify physician/advanced practitioner if interventions unsuccessful or patient reports new pain  Outcome: Progressing     Problem: INFECTION - ADULT  Goal: Absence or prevention of progression during hospitalization  Description: INTERVENTIONS:  - Assess and monitor for signs and symptoms of infection  - Monitor lab/diagnostic results  - Monitor all insertion sites, i e  indwelling lines, tubes, and drains  - Monitor endotracheal if appropriate and nasal secretions for changes in amount and color  - [place] appropriate cooling/warming therapies per order  - Administer medications as ordered  - Instruct and encourage patient and family to use good hand hygiene technique  - Identify and instruct in appropriate isolation precautions for identified infection/condition  Outcome: Progressing  Goal: Absence of fever/infection during neutropenic period  Description: INTERVENTIONS:  - Monitor WBC    Outcome: Progressing     Problem: SAFETY ADULT  Goal: Maintain or return to baseline ADL function  Description: INTERVENTIONS:  -  Assess patient's ability to carry out ADLs; assess patient's baseline for ADL function and identify physical deficits which impact ability to perform ADLs (bathing, care of mouth/teeth, toileting, grooming, dressing, etc )  - Assess/evaluate cause of self-care deficits   - Assess range of motion  - Assess patient's mobility; develop plan if impaired  - Assess patient's need for assistive devices and provide as appropriate  - Encourage maximum independence but intervene and supervise when necessary  - Involve family in performance of ADLs  - Assess for home care needs following discharge   - Consider OT consult to assist with ADL evaluation and planning for discharge  - Provide patient education as appropriate  Outcome: Progressing  Goal: Maintains/Returns to pre admission functional level  Description: INTERVENTIONS:  - Perform BMAT or MOVE assessment daily    - Set and communicate daily mobility goal to care team and patient/family/caregiver  - Collaborate with rehabilitation services on mobility goals if consulted  - Perform Range of Motion 3 times a day  - Reposition patient every 2 hours    - Dangle patient 3 times a day  - Stand patient 3 times a day  - Ambulate patient 3 times a day  - Out of bed to chair 3 times a day   - Out of bed for meals 3 times a day  - Out of bed for toileting  - Record patient progress and toleration of activity level   Outcome: Progressing     Problem: DISCHARGE PLANNING  Goal: Discharge to home or other facility with appropriate resources  Description: INTERVENTIONS:  - Identify barriers to discharge w/patient and caregiver  - Arrange for needed discharge resources and transportation as appropriate  - Identify discharge learning needs (meds, wound care, etc )  - Arrange for interpretive services to assist at discharge as needed  - Refer to Case Management Department for coordinating discharge planning if the patient needs post-hospital services based on physician/advanced practitioner order or complex needs related to functional status, cognitive ability, or social support system  Outcome: Progressing     Problem: Knowledge Deficit  Goal: Patient/family/caregiver demonstrates understanding of disease process, treatment plan, medications, and discharge instructions  Description: Complete learning assessment and assess knowledge base    Interventions:  - Provide teaching at level of understanding  - Provide teaching via preferred learning methods  Outcome: Progressing     Problem: NEUROSENSORY - ADULT  Goal: Achieves stable or improved neurological status  Description: INTERVENTIONS  - Monitor and report changes in neurological status  - Monitor vital signs such as temperature, blood pressure, glucose, and any other labs ordered   - Initiate measures to prevent increased intracranial pressure  - Monitor for seizure activity and implement precautions if appropriate      Outcome: Progressing  Goal: Remains free of injury related to seizures activity  Description: INTERVENTIONS  - Maintain airway, patient safety  and administer oxygen as ordered  - Monitor patient for seizure activity, document and report duration and description of seizure to physician/advanced practitioner  - If seizure occurs,  ensure patient safety during seizure  - Reorient patient post seizure  - Seizure pads on all 4 side rails  - Instruct patient/family to notify RN of any seizure activity including if an aura is experienced  - Instruct patient/family to call for assistance with activity based on nursing assessment  - Administer anti-seizure medications if ordered    Outcome: Progressing  Goal: Achieves maximal functionality and self care  Description: INTERVENTIONS  - Monitor swallowing and airway patency with patient fatigue and changes in neurological status  - Encourage and assist patient to increase activity and self care     - Encourage visually impaired, hearing impaired and aphasic patients to use assistive/communication devices  Outcome: Progressing

## 2022-03-02 NOTE — UTILIZATION REVIEW
Initial Clinical Review    Admission: Date/Time/Statement:   Admission Orders (From admission, onward)     Ordered        03/01/22 2108  Inpatient Admission  Once                      Orders Placed This Encounter   Procedures    Inpatient Admission     Standing Status:   Standing     Number of Occurrences:   1     Order Specific Question:   Level of Care     Answer:   Med Surg [16]     Order Specific Question:   Estimated length of stay     Answer:   More than 2 Midnights     Order Specific Question:   Certification     Answer:   I certify that inpatient services are medically necessary for this patient for a duration of greater than two midnights  See H&P and MD Progress Notes for additional information about the patient's course of treatment  ED Arrival Information     Expected Arrival Acuity    - 3/1/2022 15:37 Emergent         Means of arrival Escorted by Service Admission type    [facility] Emergency         Arrival complaint    seizure/head injury        Chief Complaint   Patient presents with    Seizure - Prior Hx Of     Reports no formal diagnoses, reports "multiple seizures" Reports head strike, denies blood thinners  Detox pt 3 weeks ago  Does not think these are withdrawal seizures       Initial Presentation: 31 y o  male from home presented to the ED s/p fall and seizure  PMH of alcohol abuse, brain concussion, seizure disorder started at age 17 on Depakote for a few months and stopped on his own  Pt reports hx of heavy alcohol use for past 6 months, was admitted to [place], [place] for alcohol detox for 8 days transferred to [facility] recovery center last week  He was seen in ED on 2/22/2022 for seizure after he was tapered off Ativan  Also reports seizure associated with fall in 01/2022  Reports not feeling well today, fell to the floor from bed and hit his head multiple times and had witnessed seizure  Pt bit his tongue, but denies bowel or bladder incontinence   Complains of feeling more forgetful and occipital headache  Currently on Keppra 750mg bid  Plan: Inpatient admission for evaluation and treatment of seizure, fall: Ativan p r n  for seizure, seizure precaution, increase Keppra to 1000 mg b i d , Neurology consult, EEG, MRIB if eeg negative, Continue thiamine, folic acid, Fall precaution, hold Klonopin while on Ativan    Date: 03/02 Day 2:   IM Notes: Patient still states that he feels a little off but overall states that he is a little better compared to yesterday  He states he is not yet back to 100%   Ativan p r n  for seizure  Cont seizure precaution  Keppra  MRI pending report  PRN Tylenol  Cont clonidine, propanolol and losartan, fall precaution, ambulate with supervision  PE, Alert, cooperative, weak  No focal deficits  ED Triage Vitals   Temperature Pulse Respirations Blood Pressure SpO2   03/01/22 1557 03/01/22 1557 03/01/22 1557 03/01/22 1557 03/01/22 1557   98 3 °F (36 8 °C) 60 16 130/77 100 %      Temp Source Heart Rate Source Patient Position - Orthostatic VS BP Location FiO2 (%)   03/01/22 1557 03/01/22 1557 03/01/22 1557 03/01/22 1557 --   Oral Monitor Sitting Left arm       Pain Score       03/02/22 0824       8          Wt Readings from Last 1 Encounters:   03/02/22 90 7 kg (200 lb)     Additional Vital Signs:   Date/Time Temp Pulse Resp BP MAP (mmHg) SpO2 O2 Device Patient Position - Orthostatic VS   03/02/22 15:02:13 98 3 °F (36 8 °C) 103 -- 132/83 99 98 % -- --       Date/Time Temp Pulse Resp BP SpO2 O2 Device Patient Position - Orthostatic VS   03/02/22 0827 -- -- -- 114/80 -- -- --   03/02/22 0731 -- 87 16 109/65 95 % None (Room air) Lying   03/01/22 2018 -- 95 16 118/78 98 % None (Room air) Sitting   03/01/22 1700 -- -- -- -- -- None (Room air) --       Pertinent Labs/Diagnostic Test Results:   MRI brain seizure wo and w contrast   Final Result by Jose R Dumont DO (03/02 1530)      Normal MRI of the brain           Workstation performed: [id number]         XR follow up   Final Result by Lynette Bedolla MD (03/02 1051)      No radiopaque orbital foreign body  Workstation performed: [id number]         CTA head and neck with and without contrast   ED Interpretation by Yun Holliday DO (03/01 2027)   1  No evidence of acute infarct, intracranial hemorrhage or mass  2   No stenosis, dissection or occlusion of the carotid or vertebral arteries or major vessels of the [place]  Final Result by Chioma Ríos MD (03/01 1923)         1  No evidence of acute infarct, intracranial hemorrhage or mass  2   No stenosis, dissection or occlusion of the carotid or vertebral arteries or major vessels of the [place]                    Workstation performed: [id number]           03/01 EKG result: NSR      Results from last 7 days   Lab Units 03/01/22  1719   WBC Thousand/uL 7 52   HEMOGLOBIN g/dL 13 7   HEMATOCRIT % 42 1   PLATELETS Thousands/uL 249   NEUTROS ABS Thousands/µL 4 81         Results from last 7 days   Lab Units 03/01/22  1719   SODIUM mmol/L 141   POTASSIUM mmol/L 4 2   CHLORIDE mmol/L 104   CO2 mmol/L 28   ANION GAP mmol/L 9   BUN mg/dL 17   CREATININE mg/dL 1 18   EGFR ml/min/1 73sq m 80   CALCIUM mg/dL 9 2   MAGNESIUM mg/dL 2 1     Results from last 7 days   Lab Units 03/01/22  1719   AST U/L 26   ALT U/L 61   ALK PHOS U/L 50   TOTAL PROTEIN g/dL 7 6   ALBUMIN g/dL 4 1   TOTAL BILIRUBIN mg/dL 0 36         Results from last 7 days   Lab Units 03/01/22  1719   GLUCOSE RANDOM mg/dL 110     Results from last 7 days   Lab Units 03/01/22  2017 03/01/22  1719   HS TNI 0HR ng/L  --  <2   HS TNI 2HR ng/L 2  --    HSTNI D2 ng/L >0  --              Results from last 7 days   Lab Units 03/01/22  1719   TSH 3RD GENERATON uIU/mL 0 819             Results from last 7 days   Lab Units 03/01/22  1723   PROLACTIN ng/mL 7 7     ED Treatment:   Medication Administration from 03/01/2022 1537 to 03/02/2022 1029 Date/Time Order Dose Route Action     03/01/2022 1732 LORazepam (ATIVAN) injection 1 mg 1 mg Intravenous Given     03/01/2022 1815 iohexol (OMNIPAQUE) 350 MG/ML injection (SINGLE-DOSE) 85 mL 85 mL Intravenous Given     03/02/2022 0824 enoxaparin (LOVENOX) subcutaneous injection 40 mg 40 mg Subcutaneous Given     03/02/2022 0824 levETIRAcetam (KEPPRA) tablet 1,000 mg 1,000 mg Oral Given     03/01/2022 2323 levETIRAcetam (KEPPRA) tablet 1,000 mg 1,000 mg Oral Given     03/02/2022 0827 cloNIDine (CATAPRES) tablet 0 1 mg 0 1 mg Oral Not Given     03/01/2022 2316 cloNIDine (CATAPRES) tablet 0 1 mg 0 1 mg Oral Not Given     03/02/2022 0827 gabapentin (NEURONTIN) capsule 300 mg 300 mg Oral Not Given     03/01/2022 2323 gabapentin (NEURONTIN) capsule 300 mg 300 mg Oral Not Given     03/02/2022 8058 losartan (COZAAR) tablet 50 mg 50 mg Oral Not Given     03/01/2022 2316 losartan (COZAAR) tablet 50 mg 50 mg Oral Not Given     03/02/2022 0828 propranolol (INDERAL) tablet 60 mg 60 mg Oral Not Given     03/01/2022 2316 propranolol (INDERAL) tablet 60 mg 60 mg Oral Not Given     03/02/2022 0826 FLUoxetine (PROzac) capsule 20 mg 20 mg Oral Given     50/96/3683 1132 folic acid (FOLVITE) tablet 1 mg 1 mg Oral Given     03/02/2022 1404 thiamine tablet 100 mg 100 mg Oral Given     03/02/2022 0824 acetaminophen (TYLENOL) tablet 650 mg 650 mg Oral Given     03/01/2022 2323 acetaminophen (TYLENOL) tablet 650 mg 650 mg Oral Given     03/01/2022 2323 LORazepam (ATIVAN) injection 1 mg 1 mg Intravenous Given     03/02/2022 0001 sodium chloride 0 9 % infusion 100 mL/hr Intravenous New Bag        Past Medical History:   Diagnosis Date    Alcohol abuse     Brain concussion     Hypertension     Seizure disorder ([facility] )      Present on Admission:  **None**      Admitting Diagnosis: [facility]izure ([facility] ) [R56 9]  Breakthrough seizure ([facility] ) [G40 919]  Age/Sex: 31 y o  male  Admission Orders:   SCD  Seizure precautions  Scheduled Medications:  cloNIDine, 0 1 mg, Oral, Q12H MORGAN  enoxaparin, 40 mg, Subcutaneous, Daily  FLUoxetine, 20 mg, Oral, Daily  folic acid, 1 mg, Oral, Daily  gabapentin, 300 mg, Oral, TID  levETIRAcetam, 1,000 mg, Oral, Q12H MORGAN  losartan, 50 mg, Oral, BID  propranolol, 60 mg, Oral, Q12H [address]  thiamine, 100 mg, Oral, Daily      Continuous IV Infusions:  sodium chloride, 100 mL/hr, Intravenous, Continuous      PRN Meds:  acetaminophen, 650 mg, Oral, Q6H PRN   baclofen, 20 mg, Oral, TID PRN  hydrOXYzine HCL, 25 mg, Oral, Q6H PRN  LORazepam, 1 mg, Intravenous, Q4H PRN 03/02 x 1        IP CONSULT TO NEUROLOGY    Network Utilization Review Department  ATTENTION: Please call with any questions or concerns to [phone] and carefully listen to the prompts so that you are directed to the right person  All voicemails are confidential   Marycruz Conrad all requests for admission clinical reviews, approved or denied determinations and any other requests to dedicated fax number below belonging to the campus where the patient is receiving treatment   List of dedicated fax numbers for the Facilities:  [address] DENIALS (Administrative/Medical Necessity) [phone]   [address] (Maternity/NICU/Pediatrics) [phone]   [address]  [address] [facility] [address] [address] [place] [address] [address] [facility] [phone]

## 2022-03-02 NOTE — ASSESSMENT & PLAN NOTE
· Not currently on any blood thinners  · Fall secondary to seizure  · Fall precaution, may ambulate with supervision  · CTA head/neck without acute abnormality

## 2022-03-03 ENCOUNTER — APPOINTMENT (INPATIENT)
Dept: NEUROLOGY | Facility: HOSPITAL | Age: 37
DRG: 101 | End: 2022-03-03
Attending: INTERNAL MEDICINE
Payer: COMMERCIAL

## 2022-03-03 PROBLEM — G40.919 BREAKTHROUGH SEIZURE (HCC): Status: ACTIVE | Noted: 2022-03-01

## 2022-03-03 LAB
ALBUMIN SERPL BCP-MCNC: 3.7 G/DL (ref 3.5–5)
ALP SERPL-CCNC: 38 U/L (ref 46–116)
ALT SERPL W P-5'-P-CCNC: 50 U/L (ref 12–78)
ANION GAP SERPL CALCULATED.3IONS-SCNC: 7 MMOL/L (ref 4–13)
AST SERPL W P-5'-P-CCNC: 24 U/L (ref 5–45)
BILIRUB SERPL-MCNC: 0.94 MG/DL (ref 0.2–1)
BUN SERPL-MCNC: 7 MG/DL (ref 5–25)
CALCIUM SERPL-MCNC: 8.7 MG/DL (ref 8.3–10.1)
CHLORIDE SERPL-SCNC: 104 MMOL/L (ref 100–108)
CO2 SERPL-SCNC: 28 MMOL/L (ref 21–32)
CREAT SERPL-MCNC: 0.75 MG/DL (ref 0.6–1.3)
ERYTHROCYTE [DISTWIDTH] IN BLOOD BY AUTOMATED COUNT: 14.7 % (ref 11.6–15.1)
GFR SERPL CREATININE-BSD FRML MDRD: 119 ML/MIN/1.73SQ M
GLUCOSE SERPL-MCNC: 97 MG/DL (ref 65–140)
HCT VFR BLD AUTO: 38.5 % (ref 36.5–49.3)
HGB BLD-MCNC: 12.7 G/DL (ref 12–17)
MCH RBC QN AUTO: 29.5 PG (ref 26.8–34.3)
MCHC RBC AUTO-ENTMCNC: 33 G/DL (ref 31.4–37.4)
MCV RBC AUTO: 90 FL (ref 82–98)
PLATELET # BLD AUTO: 226 THOUSANDS/UL (ref 149–390)
PMV BLD AUTO: 11.8 FL (ref 8.9–12.7)
POTASSIUM SERPL-SCNC: 3.7 MMOL/L (ref 3.5–5.3)
PROT SERPL-MCNC: 6.8 G/DL (ref 6.4–8.2)
RBC # BLD AUTO: 4.3 MILLION/UL (ref 3.88–5.62)
SODIUM SERPL-SCNC: 139 MMOL/L (ref 136–145)
WBC # BLD AUTO: 5.79 THOUSAND/UL (ref 4.31–10.16)

## 2022-03-03 PROCEDURE — 80053 COMPREHEN METABOLIC PANEL: CPT | Performed by: INTERNAL MEDICINE

## 2022-03-03 PROCEDURE — 99233 SBSQ HOSP IP/OBS HIGH 50: CPT | Performed by: FAMILY MEDICINE

## 2022-03-03 PROCEDURE — 95816 EEG AWAKE AND DROWSY: CPT

## 2022-03-03 PROCEDURE — 85027 COMPLETE CBC AUTOMATED: CPT | Performed by: INTERNAL MEDICINE

## 2022-03-03 PROCEDURE — 95819 EEG AWAKE AND ASLEEP: CPT | Performed by: PSYCHIATRY & NEUROLOGY

## 2022-03-03 RX ORDER — FOLIC ACID 1 MG/1
1 TABLET ORAL DAILY
Qty: 30 TABLET | Refills: 0 | Status: CANCELLED | OUTPATIENT
Start: 2022-03-04

## 2022-03-03 RX ORDER — FLUOXETINE HYDROCHLORIDE 20 MG/1
20 CAPSULE ORAL DAILY
Qty: 30 CAPSULE | Refills: 0 | Status: CANCELLED | OUTPATIENT
Start: 2022-03-04

## 2022-03-03 RX ORDER — GABAPENTIN 300 MG/1
300 CAPSULE ORAL 3 TIMES DAILY
Qty: 30 CAPSULE | Refills: 0 | Status: CANCELLED | OUTPATIENT
Start: 2022-03-03

## 2022-03-03 RX ADMIN — GABAPENTIN 300 MG: 300 CAPSULE ORAL at 16:28

## 2022-03-03 RX ADMIN — ACETAMINOPHEN 650 MG: 325 TABLET, FILM COATED ORAL at 12:08

## 2022-03-03 RX ADMIN — LEVETIRACETAM 1000 MG: 500 TABLET, FILM COATED ORAL at 21:22

## 2022-03-03 RX ADMIN — GABAPENTIN 300 MG: 300 CAPSULE ORAL at 08:26

## 2022-03-03 RX ADMIN — HYDROXYZINE HYDROCHLORIDE 25 MG: 25 TABLET ORAL at 12:09

## 2022-03-03 RX ADMIN — PROPRANOLOL HYDROCHLORIDE 60 MG: 20 TABLET ORAL at 21:22

## 2022-03-03 RX ADMIN — LOSARTAN POTASSIUM 50 MG: 50 TABLET, FILM COATED ORAL at 21:22

## 2022-03-03 RX ADMIN — PROPRANOLOL HYDROCHLORIDE 60 MG: 20 TABLET ORAL at 08:31

## 2022-03-03 RX ADMIN — THIAMINE HCL TAB 100 MG 100 MG: 100 TAB at 08:31

## 2022-03-03 RX ADMIN — LOSARTAN POTASSIUM 50 MG: 50 TABLET, FILM COATED ORAL at 08:33

## 2022-03-03 RX ADMIN — CLONIDINE HYDROCHLORIDE 0.1 MG: 0.1 TABLET ORAL at 08:30

## 2022-03-03 RX ADMIN — CLONIDINE HYDROCHLORIDE 0.1 MG: 0.1 TABLET ORAL at 21:22

## 2022-03-03 RX ADMIN — GABAPENTIN 300 MG: 300 CAPSULE ORAL at 21:22

## 2022-03-03 RX ADMIN — LEVETIRACETAM 1000 MG: 500 TABLET, FILM COATED ORAL at 08:25

## 2022-03-03 RX ADMIN — SODIUM CHLORIDE 100 ML/HR: 9 INJECTION, SOLUTION INTRAVENOUS at 06:08

## 2022-03-03 RX ADMIN — ENOXAPARIN SODIUM 40 MG: 40 INJECTION SUBCUTANEOUS at 08:33

## 2022-03-03 RX ADMIN — FLUOXETINE 20 MG: 20 CAPSULE ORAL at 08:31

## 2022-03-03 RX ADMIN — FOLIC ACID 1 MG: 1 TABLET ORAL at 08:31

## 2022-03-03 NOTE — CASE MANAGEMENT
Case Management Discharge Planning Note    Patient name Lenny Ortiz  Location [address] 412/-35 MRN [id number]  : 1985 Date 3/3/2022       Current Admission Date: 3/1/2022  Current Admission Diagnosis:Seizure [facility])   Patient Active Problem List    Diagnosis Date Noted    Seizure ([address] ) 2022    Alcohol abuse 2022    Fall 2022    Depression 2022    Hypertension 2022    Headache 2022      LOS (days): 2  Geometric Mean LOS (GMLOS) (days):   Days to GMLOS:     OBJECTIVE:  Risk of Unplanned Readmission Score: 8         Current admission status: Inpatient   Preferred Pharmacy:   PATIENT/FAMILY REPORTS NO PREFERRED PHARMACY  No address on file      Primary Care Provider: No primary care provider on file  Primary Insurance: BLUE CROSS  Secondary Insurance:     DISCHARGE DETAILS:    Discharge planning discussed with[de-identified] per MD pt is not medically cleared  anticipate 24-48   pt to return to [address] [place] [address]

## 2022-03-03 NOTE — ASSESSMENT & PLAN NOTE
/79   Pulse 99   Temp 97 8 °F (36 6 °C)   Resp 17   SpO2 97%     History of alcohol abuse recently completed detox at [facility] transition to [facility] recovery center presents with seizure on 2/22/2022 seen in the ED here, discharged back to [facility] recovery center presents with breakthrough seizure today  -Ativan p r n  for seizure  -seizure precaution to continue  -increase Keppra to 1000 mg b i d   -patient with no recent neurologic evaluation for multiple seizure, will need EEG and neuro consult  -MRI unremarkable  Neurology consult appreciated, pending EEG

## 2022-03-03 NOTE — ASSESSMENT & PLAN NOTE
· Recent completed detox at [address] [place] [address] to [facility] recovery center  · Last alcohol intake 3 weeks ago, on Klonopin taper, will hold while giving Ativan  · Continue thiamine, folic acid

## 2022-03-03 NOTE — ASSESSMENT & PLAN NOTE
/79   Pulse 99   Temp 97 8 °F (36 6 °C)   Resp 17   SpO2 97%     History of alcohol abuse recently completed detox at [facility] transition to [facility] recovery center presents with seizure on 2/22/2022 seen in the ED here, discharged back to [facility] recovery center presents with breakthrough seizure today  -Ativan p r n  for seizure  -seizure precaution to continue  -increased Keppra to 1000 mg b i d   -MRI unremarkable  Neurology consult appreciated, patient can EEG as outpatient follow-up with neurology as outpatient

## 2022-03-03 NOTE — CASE MANAGEMENT
Case Management Discharge Planning Note    Patient name Shari Chavez  Location [address] 412/-95 MRN [id number]  : 1985 Date 3/3/2022       Current Admission Date: 3/1/2022  Current Admission Diagnosis:Breakthrough seizure [facility])   Patient Active Problem List    Diagnosis Date Noted    Breakthrough seizure ([facility] ) 2022    Alcohol abuse 2022    Fall 2022    Depression 2022    Hypertension 2022    Headache 2022      LOS (days): 2  Geometric Mean LOS (GMLOS) (days):   Days to GMLOS:     OBJECTIVE:  Risk of Unplanned Readmission Score: 8         Current admission status: Inpatient   Preferred Pharmacy:   PATIENT/FAMILY REPORTS NO PREFERRED PHARMACY  No address on file      Primary Care Provider: No primary care provider on file  Primary Insurance: BLUE CROSS  Secondary Insurance:     DISCHARGE DETAILS:    Discharge planning discussed with[de-identified] cm met w pt to explain the [facility] needs to review records and approve his return prior to admission back, may take a few hours and likely will not occur until tomorrow

## 2022-03-03 NOTE — PROGRESS NOTES
[address] [facility]  Progress Note - Kathy Batters 1985, 31 y o  male MRN: [id number]  Unit/Bed#: -Samantha Encounter: [id number]  Primary Care Provider: No primary care provider on file  Date and time admitted to hospital: 3/1/2022  4:34 PM    * Seizure ([address] )  Assessment & Plan      /79   Pulse 99   Temp 97 8 °F (36 6 °C)   Resp 17   SpO2 97%     History of alcohol abuse recently completed detox at [facility] transition to [facility] presents with seizure on 2/22/2022 seen in the ED here, discharged back to [facility] presents with breakthrough seizure today  -Ativan p r n  for seizure  -seizure precaution to continue  -increased Keppra to 1000 mg b i d   -MRI unremarkable  Neurology consult appreciated, patient can EEG as outpatient follow-up with neurology as outpatient      Headache  Assessment & Plan  -CTA head and neck unremarkable  -Acetaminophen p r n  to continue    Hypertension  Assessment & Plan  Continue clonidine, propranolol and losartan    Blood pressure 123/79, pulse 99, temperature 97 8 °F (36 6 °C), resp  rate 17, SpO2 97 %  Fall  Assessment & Plan  · Not currently on any blood thinners  · Fall secondary to seizure  · Fall precaution, may ambulate with supervision  · CTA head/neck without acute abnormality     Alcohol abuse  Assessment & Plan  · Recent completed detox at [address] [place] [address] transition to [facility]  · Last alcohol intake 3 weeks ago, on Klonopin taper, will hold while giving Ativan  · Continue thiamine, folic acid  Discharging Physician / Practitioner: Oscar Novak MD  PCP: No primary care provider on file    Admission Date:   Admission Orders (From admission, onward)     Ordered        03/01/22 2108  Inpatient Admission  Once                      Discharge Date: 03/03/22    Medical Problems             Resolved Problems  Date Reviewed: 3/2/2022    None Consultations During Hospital Stay:  · Neurology    Procedures Performed:   · None    Significant Findings / Test Results:   · None    Incidental Findings:   · None     Test Results Pending at Discharge (will require follow up): None     Outpatient Tests Requested:  · Eeg    Complications:  None    Reason for Admission:  Seizure    Hospital Course:     Tan Joaquin is a 31 y o  male patient who originally presented to the hospital on 3/1/2022 due to seizure  Patient's Keppra was increased to 1000 b i d   No seizure activity during hospitalization  Neurology was consulted  MRI of the brain unremarkable Patient can be discharged home with EEG as outpatient  He will follow-up with neurology as outpatient        Please see above list of diagnoses and related plan for additional information  Condition at Discharge: good     Discharge Day Visit / Exam:     * Please refer to separate progress note for these details *    Discussion with Family:  With patient    Discharge instructions/Information to patient and family:   See after visit summary for information provided to patient and family  Provisions for Follow-Up Care:  See after visit summary for information related to follow-up care and any pertinent home health orders  Disposition:     Home    For Discharges to [facility] SNF:   · Not Applicable to this Patient - Not Applicable to this Patient    Planned Readmission:  No     Discharge Statement:  I spent 35 minutes discharging the patient  This time was spent on the day of discharge  I had direct contact with the patient on the day of discharge  Greater than 50% of the total time was spent examining patient, answering all patient questions, arranging and discussing plan of care with patient as well as directly providing post-discharge instructions  Additional time then spent on discharge activities      Discharge Medications:  See after visit summary for reconciled discharge medications provided to patient and family        ** Please Note: This note has been constructed using a voice recognition system **

## 2022-03-03 NOTE — PLAN OF CARE
Patient got very upset after MD visit  removed Robles Baez and lucy requested another primary   at bedside talking to patient MD made aware  Patient were chewing tobacco in room explain hospital policy regarding smoking   Patient  offered  nicotine patch refused and spit out the tobacco charge nurse made aware   1800 patient very calm and quiet apologize for prior behavior  lesa  new IV and bracelet applied no complaint

## 2022-03-03 NOTE — ASSESSMENT & PLAN NOTE
· Recent completed detox at [address] [place] [address] to [facility] recovery center  · Last alcohol intake 3 weeks ago, on Klonopin taper, will hold while giving Ativan  · Continue thiamine, folic acid

## 2022-03-03 NOTE — CASE MANAGEMENT
Case Management Discharge Planning Note    Patient name Charisse Gonzales  Location [address] 412/-52 MRN [id number]  : 1985 Date 3/3/2022       Current Admission Date: 3/1/2022  Current Admission Diagnosis:Breakthrough seizure [facility])   Patient Active Problem List    Diagnosis Date Noted    Breakthrough seizure ([address] ) 2022    Alcohol abuse 2022    Fall 2022    Depression 2022    Hypertension 2022    Headache 2022      LOS (days): 2  Geometric Mean LOS (GMLOS) (days):   Days to GMLOS:     OBJECTIVE:  Risk of Unplanned Readmission Score: 8         Current admission status: Inpatient   Preferred Pharmacy:   PATIENT/FAMILY REPORTS NO PREFERRED PHARMACY  No address on file      Primary Care Provider: No primary care provider on file  Primary Insurance: BLUE CROSS  Secondary Insurance:     DISCHARGE DETAILS:    Discharge planning discussed with[de-identified] per MD pt can return to [facility]  val spoke to [facility] in admissions at [facility]  she stated that MD will need to review AVS/notes/med list prior to approval for him to return  cm faxed to [phone]  pharmacy is [facility] pharmacy [address], [phone]  per [facility] MD may call MD directly Dr Saba Cristina [phone] to speak about case  MD charly gómez to fax cm med list from facility   awaiting confirmation from [facility] that pt can return

## 2022-03-03 NOTE — DISCHARGE SUMMARY
[facility]  Discharge- Viki Johnson 1985, 31 y o  male MRN: [id number]  Unit/Bed#: -01 Encounter: [id number]  Primary Care Provider: No primary care provider on file  Date and time admitted to hospital: 3/1/2022  4:34 PM      * Seizure ([address] )  Assessment & Plan        /79   Pulse 99   Temp 97 8 °F (36 6 °C)   Resp 17   SpO2 97%      History of alcohol abuse recently completed detox at [facility] to [facility] presents with seizure on 2/22/2022 seen in the ED here, discharged back to [facility] presents with breakthrough seizure today  -Ativan p r n  for seizure  -seizure precaution to continue  -increased Keppra to 1000 mg b i d   -MRI unremarkable  Neurology consult appreciated, patient can EEG as outpatient follow-up with neurology as outpatient        Headache  Assessment & Plan  -CTA head and neck unremarkable  -Acetaminophen p r n  to continue     Hypertension  Assessment & Plan  Continue clonidine, propranolol and losartan     Blood pressure 123/79, pulse 99, temperature 97 8 °F (36 6 °C), resp  rate 17, SpO2 97 %           Fall  Assessment & Plan  · Not currently on any blood thinners  · Fall secondary to seizure  · Fall precaution, may ambulate with supervision  · CTA head/neck without acute abnormality      Alcohol abuse  Assessment & Plan  · Recent completed detox at [address] [place] [address] transition to [facility]  · Last alcohol intake 3 weeks ago, on Klonopin taper, will hold while giving Ativan  · Continue thiamine, folic acid            Discharging Physician / Practitioner: Michaela Dunlap MD  PCP: No primary care provider on file    Admission Date:       Admission Orders (From admission, onward)              Ordered          03/01/22 2108   Inpatient Admission  Once                          Discharge Date: 03/03/22         Medical Problems                        Resolved Problems Date Reviewed: 3/2/2022           None                     Consultations During Hospital Stay:  · Neurology     Procedures Performed:   · None     Significant Findings / Test Results:   · None     Incidental Findings:   · None      Test Results Pending at Discharge (will require follow up): None     Outpatient Tests Requested:  · Eeg     Complications:  None     Reason for Admission:  Seizure     Hospital Course:      Tan Joaquin is a 31 y o  male patient who originally presented to the hospital on 3/1/2022 due to seizure  Patient's Keppra was increased to 1000 b i d   No seizure activity during hospitalization  Neurology was consulted  MRI of the brain unremarkable Patient can be discharged home with EEG as outpatient  He will follow-up with neurology as outpatient           Please see above list of diagnoses and related plan for additional information       Condition at Discharge: good      Discharge Day Visit / Exam:      * Please refer to separate progress note for these details *     Discussion with Family:  With patient     Discharge instructions/Information to patient and family:   See after visit summary for information provided to patient and family        Provisions for Follow-Up Care:  See after visit summary for information related to follow-up care and any pertinent home health orders        Disposition:      Home     For Discharges to   [address] SNF:   · Not Applicable to this Patient - Not Applicable to this Patient     Planned Readmission:  No     Discharge Statement:  I spent 35 minutes discharging the patient  This time was spent on the day of discharge  I had direct contact with the patient on the day of discharge  Greater than 50% of the total time was spent examining patient, answering all patient questions, arranging and discussing plan of care with patient as well as directly providing post-discharge instructions    Additional time then spent on discharge activities      Discharge Medications:  See after visit summary for reconciled discharge medications provided to patient and family        ** Please Note: This note has been constructed using a voice recognition system **

## 2022-03-03 NOTE — UTILIZATION REVIEW
Inpatient Admission Authorization Request   NOTIFICATION OF INPATIENT ADMISSION/INPATIENT AUTHORIZATION REQUEST   SERVICING FACILITY:   [address] [place] [address]  Tax ID: [id number]  NPI: [id number]  Place of Service: Inpatient [facility]  [address]  Place of Service Code: 24     ATTENDING PROVIDER:  Attending Name and NPI#: Maria E Perales, [address] [[id number]]  Address: [address] [place] [address]  Phone: [phone]     UTILIZATION REVIEW CONTACT:  [facility] Utilization   Network Utilization Review Department  Phone: [phone]  Fax [phone]  Email: [email or web address]     PHYSICIAN ADVISORY SERVICES:  FOR BRXX-PS-AERY REVIEW - MEDICAL NECESSITY DENIAL  Phone: [phone]  Fax: [phone]  Email: [email or web address]     TYPE OF REQUEST:  Inpatient Status     ADMISSION INFORMATION:  ADMISSION DATE/TIME: 3/1/22  9:08 PM  PATIENT DIAGNOSIS CODE/DESCRIPTION:  Seizure ([address] ) [R56 9]  Breakthrough seizure ([address] ) [G40 919]  DISCHARGE DATE/TIME: No discharge date for patient encounter  IMPORTANT INFORMATION:  Please contact the [facility] directly with any questions or concerns regarding this request  Department voicemails are confidential     Send requests for admission clinical reviews, concurrent reviews, approvals, and administrative denials due to lack of clinical to fax [phone]

## 2022-03-03 NOTE — PLAN OF CARE
Problem: Potential for Falls  Goal: Patient will remain free of falls  Description: INTERVENTIONS:  - Educate patient/family on patient safety including physical limitations  - Instruct patient to call for assistance with activity   - Consult OT/PT to assist with strengthening/mobility   - Keep Call bell within reach  - Keep bed low and locked with side rails adjusted as appropriate  - Keep care items and personal belongings within reach  - Initiate and maintain comfort rounds  - Make Fall Risk Sign visible to staff  - Offer Toileting every 2 Hours, in advance of need  - Initiate/Maintain Bed alarm  - Obtain necessary fall risk management equipment: Bed Alarm  - Apply yellow socks and bracelet for high fall risk patients  - Consider moving patient to room near nurses station  Outcome: Progressing     Problem: PAIN - ADULT  Goal: Verbalizes/displays adequate comfort level or baseline comfort level  Description: Interventions:  - Encourage patient to monitor pain and request assistance  - Assess pain using appropriate pain scale  - Administer analgesics based on type and severity of pain and evaluate response  - Implement non-pharmacological measures as appropriate and evaluate response  - Consider cultural and social influences on pain and pain management  - Notify physician/advanced practitioner if interventions unsuccessful or patient reports new pain  Outcome: Progressing     Problem: INFECTION - ADULT  Goal: Absence or prevention of progression during hospitalization  Description: INTERVENTIONS:  - Assess and monitor for signs and symptoms of infection  - Monitor lab/diagnostic results  - Monitor all insertion sites, i e  indwelling lines, tubes, and drains  - Monitor endotracheal if appropriate and nasal secretions for changes in amount and color  - [place] appropriate cooling/warming therapies per order  - Administer medications as ordered  - Instruct and encourage patient and family to use good hand hygiene technique  - Identify and instruct in appropriate isolation precautions for identified infection/condition  Outcome: Progressing  Goal: Absence of fever/infection during neutropenic period  Description: INTERVENTIONS:  - Monitor WBC    Outcome: Progressing     Problem: SAFETY ADULT  Goal: Maintain or return to baseline ADL function  Description: INTERVENTIONS:  -  Assess patient's ability to carry out ADLs; assess patient's baseline for ADL function and identify physical deficits which impact ability to perform ADLs (bathing, care of mouth/teeth, toileting, grooming, dressing, etc )  - Assess/evaluate cause of self-care deficits   - Assess range of motion  - Assess patient's mobility; develop plan if impaired  - Assess patient's need for assistive devices and provide as appropriate  - Encourage maximum independence but intervene and supervise when necessary  - Involve family in performance of ADLs  - Assess for home care needs following discharge   - Consider OT consult to assist with ADL evaluation and planning for discharge  - Provide patient education as appropriate  Outcome: Progressing  Goal: Maintains/Returns to pre admission functional level  Description: INTERVENTIONS:  - Perform BMAT or MOVE assessment daily    - Set and communicate daily mobility goal to care team and patient/family/caregiver  - Collaborate with rehabilitation services on mobility goals if consulted  - Perform Range of Motion 4 times a day  - Reposition patient every 2 hours    - Dangle patient 4 times a day  - Stand patient 4 times a day  - Ambulate patient 3 times a day  - Out of bed to chair 3 times a day   - Out of bed for meals 3 times a day  - Out of bed for toileting  - Record patient progress and toleration of activity level   Outcome: Progressing     Problem: DISCHARGE PLANNING  Goal: Discharge to home or other facility with appropriate resources  Description: INTERVENTIONS:  - Identify barriers to discharge w/patient and caregiver  - Arrange for needed discharge resources and transportation as appropriate  - Identify discharge learning needs (meds, wound care, etc )  - Arrange for interpretive services to assist at discharge as needed  - Refer to Case Management Department for coordinating discharge planning if the patient needs post-hospital services based on physician/advanced practitioner order or complex needs related to functional status, cognitive ability, or social support system  Outcome: Progressing     Problem: Knowledge Deficit  Goal: Patient/family/caregiver demonstrates understanding of disease process, treatment plan, medications, and discharge instructions  Description: Complete learning assessment and assess knowledge base    Interventions:  - Provide teaching at level of understanding  - Provide teaching via preferred learning methods  Outcome: Progressing     Problem: NEUROSENSORY - ADULT  Goal: Achieves stable or improved neurological status  Description: INTERVENTIONS  - Monitor and report changes in neurological status  - Monitor vital signs such as temperature, blood pressure, glucose, and any other labs ordered   - Initiate measures to prevent increased intracranial pressure  - Monitor for seizure activity and implement precautions if appropriate      Outcome: Progressing  Goal: Remains free of injury related to seizures activity  Description: INTERVENTIONS  - Maintain airway, patient safety  and administer oxygen as ordered  - Monitor patient for seizure activity, document and report duration and description of seizure to physician/advanced practitioner  - If seizure occurs,  ensure patient safety during seizure  - Reorient patient post seizure  - Seizure pads on all 4 side rails  - Instruct patient/family to notify RN of any seizure activity including if an aura is experienced  - Instruct patient/family to call for assistance with activity based on nursing assessment  - Administer anti-seizure medications if ordered    Outcome: Progressing  Goal: Achieves maximal functionality and self care  Description: INTERVENTIONS  - Monitor swallowing and airway patency with patient fatigue and changes in neurological status  - Encourage and assist patient to increase activity and self care     - Encourage visually impaired, hearing impaired and aphasic patients to use assistive/communication devices  Outcome: Progressing

## 2022-03-03 NOTE — ASSESSMENT & PLAN NOTE
Continue clonidine, propranolol and losartan    Blood pressure 123/79, pulse 99, temperature 97 8 °F (36 6 °C), resp  rate 17, SpO2 97 %

## 2022-03-04 VITALS
DIASTOLIC BLOOD PRESSURE: 71 MMHG | RESPIRATION RATE: 16 BRPM | SYSTOLIC BLOOD PRESSURE: 112 MMHG | OXYGEN SATURATION: 98 % | TEMPERATURE: 98.2 F | HEART RATE: 85 BPM

## 2022-03-04 PROCEDURE — 99239 HOSP IP/OBS DSCHRG MGMT >30: CPT | Performed by: FAMILY MEDICINE

## 2022-03-04 RX ORDER — LEVETIRACETAM 1000 MG/1
1000 TABLET ORAL EVERY 12 HOURS SCHEDULED
Qty: 60 TABLET | Refills: 0 | Status: SHIPPED | OUTPATIENT
Start: 2022-03-04

## 2022-03-04 RX ADMIN — PROPRANOLOL HYDROCHLORIDE 60 MG: 20 TABLET ORAL at 08:32

## 2022-03-04 RX ADMIN — FOLIC ACID 1 MG: 1 TABLET ORAL at 08:33

## 2022-03-04 RX ADMIN — LOSARTAN POTASSIUM 50 MG: 50 TABLET, FILM COATED ORAL at 08:33

## 2022-03-04 RX ADMIN — FLUOXETINE 20 MG: 20 CAPSULE ORAL at 08:33

## 2022-03-04 RX ADMIN — GABAPENTIN 300 MG: 300 CAPSULE ORAL at 08:33

## 2022-03-04 RX ADMIN — LEVETIRACETAM 1000 MG: 500 TABLET, FILM COATED ORAL at 08:33

## 2022-03-04 RX ADMIN — CLONIDINE HYDROCHLORIDE 0.1 MG: 0.1 TABLET ORAL at 08:33

## 2022-03-04 RX ADMIN — THIAMINE HCL TAB 100 MG 100 MG: 100 TAB at 08:33

## 2022-03-04 NOTE — ASSESSMENT & PLAN NOTE
/71   Pulse 85   Temp 98 2 °F (36 8 °C)   Resp 16   SpO2 98%     History of alcohol abuse recently completed detox at [facility] transition to [facility] recovery center presents with seizure on 2/22/2022 seen in the ED here, discharged back to [facility] recovery center presents with breakthrough seizure today  -Ativan p r n  for seizure  -seizure precaution to continue  -increased Keppra to 1000 mg b i d   -MRI unremarkable  Unremarkable EEG  Neurology consult appreciated, patient with follow-up with neurology as outpatient

## 2022-03-04 NOTE — ASSESSMENT & PLAN NOTE
· Recent completed detox at [address] [place] [address] transition to [facility] recovery center  · Last alcohol intake 3 weeks ago, on Klonopin taper, will hold while giving Ativan  · Continue thiamine, folic acid    · Patient will be discharged to rehab

## 2022-03-04 NOTE — ASSESSMENT & PLAN NOTE
Continue clonidine, propranolol and losartan    Blood pressure 112/71, pulse 85, temperature 98 2 °F (36 8 °C), resp  rate 16, SpO2 98 %

## 2022-03-04 NOTE — CASE MANAGEMENT
Case Management Discharge Planning Note    Patient name Goyo King  Location [address] 412/-12 MRN [id number]  : 1985 Date 3/4/2022       Current Admission Date: 3/1/2022  Current Admission Diagnosis:Breakthrough seizure [facility])   Patient Active Problem List    Diagnosis Date Noted    Breakthrough seizure ([address] ) 2022    Alcohol abuse 2022    Fall 2022    Depression 2022    Hypertension 2022    Headache 2022      LOS (days): 3  Geometric Mean LOS (GMLOS) (days):   Days to GMLOS:     OBJECTIVE:  Risk of Unplanned Readmission Score: 9         Current admission status: Inpatient   Preferred Pharmacy:   PATIENT/FAMILY REPORTS NO PREFERRED PHARMACY  No address on file      Primary Care Provider: No primary care provider on file  Primary Insurance: BLUE CROSS  Secondary Insurance:     DISCHARGE DETAILS:    Discharge planning discussed with[de-identified] CM spoke w RN Sharmial Loja at [facility] to get fax for [employer] as not in epic  per Sharmila Loja pt already on keppra 1000 and RX not needed   Rn to call dalia when pt is ready to be picked up

## 2022-03-04 NOTE — NURSING NOTE
Discharge education done with patient; topics included reason for admission and medication patient is being discharged on  Patient verbalized understanding to all  All questions answered  Patient to be d/c'd to treatment center by staff from there

## 2022-03-04 NOTE — DISCHARGE SUMMARY
[facility]  Discharge- Latisha Dickson 1985, 31 y o  male MRN: [id number]  Unit/Bed#: -01 Encounter: [id number]  Primary Care Provider: No primary care provider on file  Date and time admitted to hospital: 3/1/2022  4:34 PM    * Breakthrough seizure ([address] )  Assessment & Plan      /71   Pulse 85   Temp 98 2 °F (36 8 °C)   Resp 16   SpO2 98%     History of alcohol abuse recently completed detox at [facility] transition to [facility] presents with seizure on 2/22/2022 seen in the ED here, discharged back to [facility] presents with breakthrough seizure today  -Ativan p r n  for seizure  -seizure precaution to continue  -increased Keppra to 1000 mg b i d   -MRI unremarkable  Unremarkable EEG  Neurology consult appreciated, patient with follow-up with neurology as outpatient      Headache  Assessment & Plan  -CTA head and neck unremarkable  -Acetaminophen p r n  to continue    Hypertension  Assessment & Plan  Continue clonidine, propranolol and losartan    Blood pressure 112/71, pulse 85, temperature 98 2 °F (36 8 °C), resp  rate 16, SpO2 98 %  Depression  Assessment & Plan  Continue Prozac daily  Fall  Assessment & Plan  · Not currently on any blood thinners  · Fall secondary to seizure  · Fall precaution, may ambulate with supervision  · CTA head/neck without acute abnormality     Alcohol abuse  Assessment & Plan  · Recent completed detox at [address] [place] [address] transition to [facility]  · Last alcohol intake 3 weeks ago, on Klonopin taper, will hold while giving Ativan  · Continue thiamine, folic acid  · Patient will be discharged to rehab        Discharging Physician / Practitioner: Genesis Dominguez MD  PCP: No primary care provider on file    Admission Date:   Admission Orders (From admission, onward)     Ordered        03/01/22 2108  Inpatient Admission  Once                      Discharge Date: 03/04/22    Medical Problems             Resolved Problems  Date Reviewed: 3/2/2022    None                Consultations During Hospital Stay:  · Neurology    Procedures Performed:   · None    Significant Findings / Test Results:   · As above    Incidental Findings:   · None    Test Results Pending at Discharge (will require follow up): · None     Outpatient Tests Requested:  · None    Complications:  None    Reason for Admission:  Seizure    Hospital Course:     Birttney Lema is a 31 y o  male patient who originally presented to the hospital on 3/1/2022 due to seizure  No seizures during admission  EEG unremarkable, brain MRI unremarkable  Neurology was consulted  His Keppra was increased to 1000 mg twice a day  Patient is deemed appropriate to discharge and follow-up with neurology as outpatient  Patient will be discharged to alcohol rehab      Please see above list of diagnoses and related plan for additional information  Condition at Discharge: good     Discharge Day Visit / Exam:     Subjective:  Patient seen and examined bedside  No acute events denies any chest pain, shortness [place], nausea, vomiting  Vitals: Blood Pressure: 112/71 (03/04/22 0813)  Pulse: 85 (03/04/22 0813)  Temperature: 98 2 °F (36 8 °C) (03/04/22 0813)  Temp Source: Oral (03/03/22 4042)  Respirations: 16 (03/04/22 0813)  SpO2: 98 % (03/04/22 0813)  Exam:   Physical Exam  Vitals and nursing note reviewed  Constitutional:       General: He is not in acute distress  Appearance: Normal appearance  HENT:      Head: Normocephalic  Nose: Nose normal       Mouth/Throat:      Mouth: Mucous membranes are moist    Eyes:      Conjunctiva/sclera: Conjunctivae normal    Cardiovascular:      Rate and Rhythm: Normal rate  Heart sounds: Normal heart sounds  Pulmonary:      Effort: Pulmonary effort is normal  No respiratory distress  Abdominal:      General: There is no distension     Musculoskeletal:         General: No swelling  Right lower leg: No edema  Skin:     General: Skin is warm  Neurological:      General: No focal deficit present  Mental Status: He is alert and oriented to person, place, and time  Psychiatric:         Mood and Affect: Mood normal        Discussion with Family:  With patient    Discharge instructions/Information to patient and family:   See after visit summary for information provided to patient and family  Provisions for Follow-Up Care:  See after visit summary for information related to follow-up care and any pertinent home health orders  Disposition:   Alcohol rehab  ·     Planned Readmission:  No     Discharge Statement:  I spent 35 minutes discharging the patient  This time was spent on the day of discharge  I had direct contact with the patient on the day of discharge  Greater than 50% of the total time was spent examining patient, answering all patient questions, arranging and discussing plan of care with patient as well as directly providing post-discharge instructions  Additional time then spent on discharge activities  Discharge Medications:  See after visit summary for reconciled discharge medications provided to patient and family        ** Please Note: This note has been constructed using a voice recognition system **

## 2022-03-04 NOTE — PLAN OF CARE
Problem: Potential for Falls  Goal: Patient will remain free of falls  Description: INTERVENTIONS:  - Educate patient/family on patient safety including physical limitations  - Instruct patient to call for assistance with activity   - Consult OT/PT to assist with strengthening/mobility   - Keep Call bell within reach  - Keep bed low and locked with side rails adjusted as appropriate  - Keep care items and personal belongings within reach  - Initiate and maintain comfort rounds  - Make Fall Risk Sign visible to staff  - Offer Toileting every  Hours, in advance of need  - Initiate/Maintain alarm  - Obtain necessary fall risk management equipment:   - Apply yellow socks and bracelet for high fall risk patients  - Consider moving patient to room near nurses station  Outcome: Adequate for Discharge     Problem: PAIN - ADULT  Goal: Verbalizes/displays adequate comfort level or baseline comfort level  Description: Interventions:  - Encourage patient to monitor pain and request assistance  - Assess pain using appropriate pain scale  - Administer analgesics based on type and severity of pain and evaluate response  - Implement non-pharmacological measures as appropriate and evaluate response  - Consider cultural and social influences on pain and pain management  - Notify physician/advanced practitioner if interventions unsuccessful or patient reports new pain  Outcome: Adequate for Discharge     Problem: INFECTION - ADULT  Goal: Absence or prevention of progression during hospitalization  Description: INTERVENTIONS:  - Assess and monitor for signs and symptoms of infection  - Monitor lab/diagnostic results  - Monitor all insertion sites, i e  indwelling lines, tubes, and drains  - Monitor endotracheal if appropriate and nasal secretions for changes in amount and color  - [place] appropriate cooling/warming therapies per order  - Administer medications as ordered  - Instruct and encourage patient and family to use good hand hygiene technique  - Identify and instruct in appropriate isolation precautions for identified infection/condition  Outcome: Adequate for Discharge  Goal: Absence of fever/infection during neutropenic period  Description: INTERVENTIONS:  - Monitor WBC    Outcome: Adequate for Discharge     Problem: SAFETY ADULT  Goal: Maintain or return to baseline ADL function  Description: INTERVENTIONS:  -  Assess patient's ability to carry out ADLs; assess patient's baseline for ADL function and identify physical deficits which impact ability to perform ADLs (bathing, care of mouth/teeth, toileting, grooming, dressing, etc )  - Assess/evaluate cause of self-care deficits   - Assess range of motion  - Assess patient's mobility; develop plan if impaired  - Assess patient's need for assistive devices and provide as appropriate  - Encourage maximum independence but intervene and supervise when necessary  - Involve family in performance of ADLs  - Assess for home care needs following discharge   - Consider OT consult to assist with ADL evaluation and planning for discharge  - Provide patient education as appropriate  Outcome: Adequate for Discharge  Goal: Maintains/Returns to pre admission functional level  Description: INTERVENTIONS:  - Perform BMAT or MOVE assessment daily    - Set and communicate daily mobility goal to care team and patient/family/caregiver  - Collaborate with rehabilitation services on mobility goals if consulted  - Perform Range of Motion  times a day  - Reposition patient every  hours    - Dangle patient  times a day  - Stand patient  times a day  - Ambulate patient  times a day  - Out of bed to chair  times a day   - Out of bed for meal  times a day  - Out of bed for toileting  - Record patient progress and toleration of activity level   Outcome: Adequate for Discharge     Problem: DISCHARGE PLANNING  Goal: Discharge to home or other facility with appropriate resources  Description: INTERVENTIONS:  - Identify barriers to discharge w/patient and caregiver  - Arrange for needed discharge resources and transportation as appropriate  - Identify discharge learning needs (meds, wound care, etc )  - Arrange for interpretive services to assist at discharge as needed  - Refer to Case Management Department for coordinating discharge planning if the patient needs post-hospital services based on physician/advanced practitioner order or complex needs related to functional status, cognitive ability, or social support system  Outcome: Adequate for Discharge     Problem: Knowledge Deficit  Goal: Patient/family/caregiver demonstrates understanding of disease process, treatment plan, medications, and discharge instructions  Description: Complete learning assessment and assess knowledge base    Interventions:  - Provide teaching at level of understanding  - Provide teaching via preferred learning methods  Outcome: Adequate for Discharge     Problem: NEUROSENSORY - ADULT  Goal: Achieves stable or improved neurological status  Description: INTERVENTIONS  - Monitor and report changes in neurological status  - Monitor vital signs such as temperature, blood pressure, glucose, and any other labs ordered   - Initiate measures to prevent increased intracranial pressure  - Monitor for seizure activity and implement precautions if appropriate      Outcome: Adequate for Discharge  Goal: Remains free of injury related to seizures activity  Description: INTERVENTIONS  - Maintain airway, patient safety  and administer oxygen as ordered  - Monitor patient for seizure activity, document and report duration and description of seizure to physician/advanced practitioner  - If seizure occurs,  ensure patient safety during seizure  - Reorient patient post seizure  - Seizure pads on all 4 side rails  - Instruct patient/family to notify RN of any seizure activity including if an aura is experienced  - Instruct patient/family to call for assistance with activity based on nursing assessment  - Administer anti-seizure medications if ordered    Outcome: Adequate for Discharge  Goal: Achieves maximal functionality and self care  Description: INTERVENTIONS  - Monitor swallowing and airway patency with patient fatigue and changes in neurological status  - Encourage and assist patient to increase activity and self care     - Encourage visually impaired, hearing impaired and aphasic patients to use assistive/communication devices  Outcome: Adequate for Discharge

## 2022-03-04 NOTE — DISCHARGE INSTR - AVS FIRST PAGE
Dear Kira Blanchard,     It was our pleasure to care for you here at [facility], on [facility]  It is our hope that we were always able to exceed the expected standards for your care during your stay  You were hospitalized due to seizures  You were cared for on the medical floor by Skyla Johnston MD with the [facility] who covers for your primary care physician (PCP), No primary care provider on file  , while you were hospitalized  If you have any questions or concerns related to this hospitalization, you may contact us at [phone]  For follow up as well as any medication refills, we recommend that you follow up with your primary care physician  A registered nurse will reach out to you by phone within a few days after your discharge to answer any additional questions that you may have after going home  However, at this time we provide for you here, the most important instructions / recommendations at discharge:     Notable Medication Adjustments -   Start taking Keppra 1000 mg twice a day, continue home medications  Testing Required after Discharge -   Follow-up with neurology  Important follow up information -   None  Other Instructions -   None  Please review this entire after visit summary as additional general instructions including medication list, appointments, activity, diet, any pertinent wound care, and other additional recommendations from your care team that may be provided for you        Sincerely,     Skyla Johnston MD

## 2022-03-05 LAB — LEVETIRACETAM SERPL-MCNC: 10.2 UG/ML (ref 10–40)

## 2022-03-08 NOTE — UTILIZATION REVIEW
Notification of Discharge   This is a Notification of Discharge from our facility [address]  Please be advised that this patient has been discharge from our facility  Below you will find the admission and discharge date and time including the patients disposition  UTILIZATION REVIEW CONTACT:  Gisel Chavis  Utilization   Network Utilization Review Department  Phone: [phone] x carefully listen to the prompts  All voicemails are confidential   Email: [email or web address]     PHYSICIAN ADVISORY SERVICES:  FOR VLLO-JW-MBOS REVIEW - MEDICAL NECESSITY DENIAL  Phone: [phone]  Fax: [phone]  Email: [email or web address]     PRESENTATION DATE: 3/1/2022  4:34 PM  OBERVATION ADMISSION DATE:   INPATIENT ADMISSION DATE: 3/1/22  9:08 PM   DISCHARGE DATE: 3/4/2022 12:24 PM  DISPOSITION: Discharge/Transfer to not defined Healthcare Facility Discharge/Transfer to not defined Healthcare Facility      IMPORTANT INFORMATION:  Send all requests for admission clinical reviews, approved or denied determinations and any other requests to dedicated fax number below belonging to the campus where the patient is receiving treatment   List of dedicated fax numbers:  [address] DENIALS (Administrative/Medical Necessity) [phone]   [address] [place] [address] (Maternity/NICU/Pediatrics) [phone]   Winnie Dixon [phone]   [address] [facility] [phone]   [address] [place] [address] [phone]   [address] [place] [address] [address] [place] [address],[address] [phone]   [facility]  [phone]   [address] [place] [address], [facility]  [address] [facility] [address] [facility] [phone]

## 2022-03-14 ENCOUNTER — TELEPHONE (OUTPATIENT)
Dept: NEUROLOGY | Facility: CLINIC | Age: 37
End: 2022-03-14

## 2022-03-14 NOTE — TELEPHONE ENCOUNTER
1st Attempt LVM to [facility] HFU appt  HFU/SLMO/ATT/AP/Seizures/DC3/4    Note from Chart:     Shreya Gould will need follow up in in 6 weeks with epilepsy attending or advance practitioner  He will not require outpatient neurological testing  Patient lives near [place], so recommend that he follow up with a Neurologist near [place] once he has completed rehab
